# Patient Record
Sex: FEMALE | Race: WHITE
[De-identification: names, ages, dates, MRNs, and addresses within clinical notes are randomized per-mention and may not be internally consistent; named-entity substitution may affect disease eponyms.]

---

## 2018-02-05 ENCOUNTER — HOSPITAL ENCOUNTER (EMERGENCY)
Dept: HOSPITAL 25 - ED | Age: 68
Discharge: HOME | End: 2018-02-05
Payer: COMMERCIAL

## 2018-02-05 VITALS — SYSTOLIC BLOOD PRESSURE: 146 MMHG | DIASTOLIC BLOOD PRESSURE: 53 MMHG

## 2018-02-05 DIAGNOSIS — Z88.6: ICD-10-CM

## 2018-02-05 DIAGNOSIS — Z90.710: ICD-10-CM

## 2018-02-05 DIAGNOSIS — E78.00: ICD-10-CM

## 2018-02-05 DIAGNOSIS — I48.91: ICD-10-CM

## 2018-02-05 DIAGNOSIS — K57.90: ICD-10-CM

## 2018-02-05 DIAGNOSIS — I45.10: ICD-10-CM

## 2018-02-05 DIAGNOSIS — I10: ICD-10-CM

## 2018-02-05 DIAGNOSIS — E11.9: ICD-10-CM

## 2018-02-05 DIAGNOSIS — Z88.5: ICD-10-CM

## 2018-02-05 DIAGNOSIS — R07.89: Primary | ICD-10-CM

## 2018-02-05 DIAGNOSIS — R32: ICD-10-CM

## 2018-02-05 DIAGNOSIS — I20.9: ICD-10-CM

## 2018-02-05 DIAGNOSIS — Z88.8: ICD-10-CM

## 2018-02-05 DIAGNOSIS — E07.9: ICD-10-CM

## 2018-02-05 DIAGNOSIS — Z79.4: ICD-10-CM

## 2018-02-05 LAB
BASOPHILS # BLD AUTO: 0.1 10^3/UL (ref 0–0.2)
EOSINOPHIL # BLD AUTO: 0.1 10^3/UL (ref 0–0.6)
HCT VFR BLD AUTO: 39 % (ref 35–47)
HGB BLD-MCNC: 13.4 G/DL (ref 12–16)
INR PPP/BLD: 0.9 (ref 0.77–1.02)
LYMPHOCYTES # BLD AUTO: 2.2 10^3/UL (ref 1–4.8)
MCH RBC QN AUTO: 30 PG (ref 27–31)
MCHC RBC AUTO-ENTMCNC: 34 G/DL (ref 31–36)
MCV RBC AUTO: 88 FL (ref 80–97)
MONOCYTES # BLD AUTO: 0.4 10^3/UL (ref 0–0.8)
NEUTROPHILS # BLD AUTO: 3.7 10^3/UL (ref 1.5–7.7)
NRBC # BLD AUTO: 0 10^3/UL
NRBC BLD QL AUTO: 0.1
PLATELET # BLD AUTO: 216 10^3/UL (ref 150–450)
RBC # BLD AUTO: 4.45 10^6/UL (ref 4–5.4)
WBC # BLD AUTO: 6.5 10^3/UL (ref 3.5–10.8)

## 2018-02-05 PROCEDURE — 84443 ASSAY THYROID STIM HORMONE: CPT

## 2018-02-05 PROCEDURE — 93005 ELECTROCARDIOGRAM TRACING: CPT

## 2018-02-05 PROCEDURE — 82550 ASSAY OF CK (CPK): CPT

## 2018-02-05 PROCEDURE — 84484 ASSAY OF TROPONIN QUANT: CPT

## 2018-02-05 PROCEDURE — 71045 X-RAY EXAM CHEST 1 VIEW: CPT

## 2018-02-05 PROCEDURE — 80053 COMPREHEN METABOLIC PANEL: CPT

## 2018-02-05 PROCEDURE — 85610 PROTHROMBIN TIME: CPT

## 2018-02-05 PROCEDURE — 82553 CREATINE MB FRACTION: CPT

## 2018-02-05 PROCEDURE — 36415 COLL VENOUS BLD VENIPUNCTURE: CPT

## 2018-02-05 PROCEDURE — 99282 EMERGENCY DEPT VISIT SF MDM: CPT

## 2018-02-05 PROCEDURE — 83880 ASSAY OF NATRIURETIC PEPTIDE: CPT

## 2018-02-05 PROCEDURE — 81003 URINALYSIS AUTO W/O SCOPE: CPT

## 2018-02-05 PROCEDURE — 83735 ASSAY OF MAGNESIUM: CPT

## 2018-02-05 PROCEDURE — 85730 THROMBOPLASTIN TIME PARTIAL: CPT

## 2018-02-05 PROCEDURE — 85025 COMPLETE CBC W/AUTO DIFF WBC: CPT

## 2018-02-05 NOTE — RAD
HISTORY: Chest pain



COMPARISONS: September 24, 2009



VIEWS: 1: frontal portable view of the chest at 1:19 PM



FINDINGS:

LINES AND TUBES: None.

CARDIOMEDIASTINAL SILHOUETTE: The cardiomediastinal silhouette is normal for portable

technique.

PLEURA: The costophrenic angles are sharp. No pleural abnormalities are noted.

LUNG PARENCHYMA: The lungs are clear.

ABDOMEN: The upper abdomen is clear. There is no subphrenic gas.

BONES AND SOFT TISSUES: No bone or soft tissue abnormalities are noted.



IMPRESSION: NO ACTIVE CARDIOPULMONARY DISEASE.

## 2018-02-06 NOTE — ED
Phil SILVA Angela, scribed for Jordan Matta MD on 02/05/18 at 1312 .





HPI Chest Pain





- HPI Summary


HPI Summary: 





This pt is a 66 y/o [facility]/o chest pain for the past year

, worsening over the last month. Pt describes her chest pain as pressure, 

radiating from breasts outwards. She rates her pain 7/0 in severity. Today pt 

states she took her blood pressure at home with 2 different machine, and had a 

blood pressure averaging around 240/110. Pt states she has LE swelling (worse 

after surgery). Pt notes she called her cardiologist (Dr. Garza) and was 

advised to come to the ED. Pt used to see Dr. Lassiter at [facility], now is followed up 

by Dr. Garza at [place]. 


She states she has pain related to her surgery from Nov. 21st. Pt had spine 

surgery on November 21st in [place]. 


PMHx: GERD, diabetes, HTN. Pt takes Levemir and Metoprolol.





- History of Current Complaint


Chief Complaint: EDChestWallPain


Time Seen by Provider: 02/05/18 13:06


Hx Obtained From: Patient


Onset/Duration: Started Days Ago, Still Present


Timing: Lasting Days


Current Severity: Moderate


Pain Intensity: 7


Pain Scale Used: 0-10 Numeric


Chest Pain Location: Diffuse


Chest Pain Radiates: Yes


Chest Pain Radiates To:: Arm - bilateral arms


Character: Pressure/Squeezing - pressure


Aggravating Factor(s): Nothing


Alleviating Factor(s): Nothing


Associated Signs and Symptoms: Positive: Chest Pain, Other: - hypertensive





- Allergy/Home Medications


Allergies/Adverse Reactions: 


 Allergies











Allergy/AdvReac Type Severity Reaction Status Date / Time


 


MS Ibuprofen [Ibuprofen] Allergy  Heartburn Verified 02/05/18 16:07


 


MS Metformin [Metformin] Allergy  Heartburn Verified 02/05/18 16:07


 


various narcotics AdvReac  Nausea Uncoded 02/05/18 16:07











Home Medications: 


 Home Medications





Bumetanide TAB* [Bumex 1 MG TAB*] 0.5 mg PO DAILY 02/05/18 [History Confirmed 02 /05/18]


Cholecalciferol TAB* [Vitamin D TAB*] 2,000 unit PO DAILY 02/05/18 [History 

Confirmed 02/05/18]


Cyanocobalamin TAB* [Vitamin B12 TAB*] 500 mcg PO DAILY 02/05/18 [History 

Confirmed 02/05/18]


Insulin ASPART (NF) [Novolog (NF)] 2 - 11 units SUBCUT ACHS 02/05/18 [History 

Confirmed 02/05/18]


Insulin Detemir (NF) [Levemir (NF)] 20 unit SUBCUT DAILY 02/05/18 [History 

Confirmed 02/05/18]


Methocarbamol TAB* [Robaxin 500 MG TAB*] 750 mg PO Q6H PRN 02/05/18 [History 

Confirmed 02/05/18]


Multivitamins/Minerals TAB* [Theragran/minerals TAB*] 1 tab PO DAILY 02/05/18 [

History Confirmed 02/05/18]


oxyCODONE TAB* [Roxycodone TAB 5 mg*] 5 - 10 mg PO Q4H PRN 02/05/18 [History 

Confirmed 02/05/18]











PMH/Surg Hx/FS Hx/Imm Hx


Endocrine/Hematology History: Reports: Hx Diabetes - type 2, Hx Thyroid Disease


Cardiovascular History: Reports: Hx Angina, Hx Hypercholesterolemia, Hx 

Hypertension


   Denies: Hx Pacemaker/ICD


Respiratory History: 


   Denies: Hx Asthma - AS A CHILD


GI History: Reports: Hx Diverticulosis, Hx Hiatal Hernia, Other GI Disorders - 

DIFFICULTY SWALLOWING, DIARRHEA/CONSTIPATION


 History: Reports: Other  Problems/Disorders - INCONTINENCE


   Denies: Hx Dialysis, Hx Renal Disease


Musculoskeletal History: Reports: Hx Arthritis, Hx Back Problems, Other 

Musculoskeletal History - WEAKNESS/TREMORS


Sensory History: Reports: Hx Cataracts, Hx Contacts or Glasses


   Denies: Hx Hearing Aid


Opthamlomology History: Reports: Hx Cataracts, Hx Contacts or Glasses


Neurological History: Reports: Hx Headaches, Other Neuro Impairments/Disorders 

- POST CONCUSSION SYNDROME, DIZZINESS


Psychiatric History: 


   Denies: Hx Panic Disorder





- Surgical History


Surgery Procedure, Year, and Place: HYSTERECTOMY; ENDOMETRIOSIS SURGERY; RT AND 

LT BREAST BIOPSY; BREAST REDUCTION;.  NOVEMBER 2017 LUMBAR FUSION AND CYST 

REMOVAL;


Infectious Disease History: No


Infectious Disease History: 


   Denies: History Other Infectious Disease, Traveled Outside the US in Last 30 

Days





- Family History


Known Family History: Positive: Diabetes, Other - prostate and skin cancer





- Social History


Alcohol Use: Rare


Alcohol Amount: wine


Substance Use Type: Reports: None


Smoking Status (MU): Never Smoked Tobacco





Review of Systems


Negative: Fever, Chills


Cardiovascular: Other - hypertension


Positive: Chest Pain


Musculoskeletal: Other - chronic pain from surgery


All Other Systems Reviewed And Are Negative: Yes





Physical Exam





- Summary


Physical Exam Summary: 





VITAL SIGNS: Reviewed.


GENERAL: Patient is a well-developed and nourished female who is lying 

comfortable in the stretcher. Patient is not in any acute respiratory distress.


HEAD AND FACE: No signs of trauma. No ecchymosis, hematomas or skull 

depressions. No sinus tenderness.


EYES: PERRLA, EOMI x 2, No injected conjunctiva, no nystagmus.


EARS: Hearing grossly intact. Ear canals and tympanic membranes are within 

normal limits.


MOUTH: Oropharynx within normal limits. Dry oral mucosa. 


NECK: Supple, trachea is midline, no adenopathy, no JVD, no carotid bruit, no c-

spine tenderness, neck with full ROM.


CHEST: Symmetric, no tenderness at palpation


LUNGS: Clear to auscultation bilaterally. No wheezing or crackles.


CVS: Regular rate and rhythm, S1 and S2 present, no murmurs or gallops 

appreciated.


ABDOMEN: Soft, non-tender. No signs of distention. No rebound no guarding, and 

no masses palpated. Bowel sounds are normal.


EXTREMITIES: FROM in all major joints, no cyanosis or clubbing. 1+ pitting 

edema in bilateral lower extremity. 


NEURO: Alert and oriented x 3. No acute neurological deficits. Speech is normal 

and follows commands.


SKIN: Dry and warm


Triage Information Reviewed: Yes


Vital Signs On Initial Exam: 


 Initial Vitals











Temp Pulse Resp BP Pulse Ox


 


 97.6 F   60   18   149/70   98 


 


 02/05/18 12:21  02/05/18 12:21  02/05/18 12:21  02/05/18 12:21  02/05/18 12:21











Vital Signs Reviewed: Yes





Diagnostics





- Vital Signs


 Vital Signs











  Temp Pulse Resp BP Pulse Ox


 


 02/05/18 12:21  97.6 F  60  18  149/70  98














- Laboratory


Lab Results: 


 Lab Results











  02/05/18 02/05/18 02/05/18 Range/Units





  13:28 13:46 13:46 


 


WBC    6.5  (3.5-10.8)  10^3/ul


 


RBC    4.45  (4.0-5.4)  10^6/ul


 


Hgb    13.4  (12.0-16.0)  g/dl


 


Hct    39  (35-47)  %


 


MCV    88  (80-97)  fL


 


MCH    30  (27-31)  pg


 


MCHC    34  (31-36)  g/dl


 


RDW    14  (10.5-15)  %


 


Plt Count    216  (150-450)  10^3/ul


 


MPV    10  (7.4-10.4)  um3


 


Neut % (Auto)    56.8  (38-83)  %


 


Lymph % (Auto)    34.4  (25-47)  %


 


Mono % (Auto)    6.2  (1-9)  %


 


Eos % (Auto)    1.8  (0-6)  %


 


Baso % (Auto)    0.8  (0-2)  %


 


Absolute Neuts (auto)    3.7  (1.5-7.7)  10^3/ul


 


Absolute Lymphs (auto)    2.2  (1.0-4.8)  10^3/ul


 


Absolute Monos (auto)    0.4  (0-0.8)  10^3/ul


 


Absolute Eos (auto)    0.1  (0-0.6)  10^3/ul


 


Absolute Basos (auto)    0.1  (0-0.2)  10^3/ul


 


Absolute Nucleated RBC    0  10^3/ul


 


Nucleated RBC %    0.1  


 


INR (Anticoag Therapy)  0.90    (0.77-1.02)  


 


APTT  30.4    (26.0-36.3)  seconds


 


Sodium     (133-145)  mmol/L


 


Potassium     (3.5-5.0)  mmol/L


 


Chloride     (101-111)  mmol/L


 


Carbon Dioxide     (22-32)  mmol/L


 


Anion Gap     (2-11)  mmol/L


 


BUN     (6-24)  mg/dL


 


Creatinine     (0.51-0.95)  mg/dL


 


Est GFR ( Amer)     (>60)  


 


Est GFR (Non-Af Amer)     (>60)  


 


BUN/Creatinine Ratio     (8-20)  


 


Glucose     ()  mg/dL


 


Calcium     (8.6-10.3)  mg/dL


 


Magnesium     (1.9-2.7)  mg/dL


 


Total Bilirubin     (0.2-1.0)  mg/dL


 


AST     (13-39)  U/L


 


ALT     (7-52)  U/L


 


Alkaline Phosphatase     ()  U/L


 


Total Creatine Kinase     ()  U/L


 


CK-MB (CK-2)     (0.6-6.3)  ng/mL


 


Troponin I     (<0.04)  ng/mL


 


B-Natriuretic Peptide   65  ( - 100) pg/mL


 


Total Protein     (6.4-8.9)  g/dL


 


Albumin     (3.2-5.2)  g/dL


 


Globulin     (2-4)  g/dL


 


Albumin/Globulin Ratio     (1-3)  


 


TSH     (0.34-5.60)  mcIU/mL


 


Urine Color     


 


Urine Appearance     


 


Urine pH     (5-9)  


 


Ur Specific Gravity     (1.010-1.030)  


 


Urine Protein     (Negative)  


 


Urine Ketones     (Negative)  


 


Urine Blood     (Negative)  


 


Urine Nitrate     (Negative)  


 


Urine Bilirubin     (Negative)  


 


Urine Urobilinogen     (Negative)  


 


Ur Leukocyte Esterase     (Negative)  


 


Urine Glucose     (Negative)  














  02/05/18 02/05/18 02/05/18 Range/Units





  13:46 15:30 15:45 


 


WBC     (3.5-10.8)  10^3/ul


 


RBC     (4.0-5.4)  10^6/ul


 


Hgb     (12.0-16.0)  g/dl


 


Hct     (35-47)  %


 


MCV     (80-97)  fL


 


MCH     (27-31)  pg


 


MCHC     (31-36)  g/dl


 


RDW     (10.5-15)  %


 


Plt Count     (150-450)  10^3/ul


 


MPV     (7.4-10.4)  um3


 


Neut % (Auto)     (38-83)  %


 


Lymph % (Auto)     (25-47)  %


 


Mono % (Auto)     (1-9)  %


 


Eos % (Auto)     (0-6)  %


 


Baso % (Auto)     (0-2)  %


 


Absolute Neuts (auto)     (1.5-7.7)  10^3/ul


 


Absolute Lymphs (auto)     (1.0-4.8)  10^3/ul


 


Absolute Monos (auto)     (0-0.8)  10^3/ul


 


Absolute Eos (auto)     (0-0.6)  10^3/ul


 


Absolute Basos (auto)     (0-0.2)  10^3/ul


 


Absolute Nucleated RBC     10^3/ul


 


Nucleated RBC %     


 


INR (Anticoag Therapy)     (0.77-1.02)  


 


APTT     (26.0-36.3)  seconds


 


Sodium  138    (133-145)  mmol/L


 


Potassium  3.8    (3.5-5.0)  mmol/L


 


Chloride  105    (101-111)  mmol/L


 


Carbon Dioxide  25    (22-32)  mmol/L


 


Anion Gap  8    (2-11)  mmol/L


 


BUN  17    (6-24)  mg/dL


 


Creatinine  0.80    (0.51-0.95)  mg/dL


 


Est GFR ( Amer)  92.0    (>60)  


 


Est GFR (Non-Af Amer)  71.5    (>60)  


 


BUN/Creatinine Ratio  21.3 H    (8-20)  


 


Glucose  154 H    ()  mg/dL


 


Calcium  9.2    (8.6-10.3)  mg/dL


 


Magnesium  2.3    (1.9-2.7)  mg/dL


 


Total Bilirubin  0.60    (0.2-1.0)  mg/dL


 


AST  14    (13-39)  U/L


 


ALT  12    (7-52)  U/L


 


Alkaline Phosphatase  54    ()  U/L


 


Total Creatine Kinase  80    ()  U/L


 


CK-MB (CK-2)  2.6    (0.6-6.3)  ng/mL


 


Troponin I  0.00   0.00  (<0.04)  ng/mL


 


B-Natriuretic Peptide    ( - 100) pg/mL


 


Total Protein  6.4    (6.4-8.9)  g/dL


 


Albumin  4.0    (3.2-5.2)  g/dL


 


Globulin  2.4    (2-4)  g/dL


 


Albumin/Globulin Ratio  1.7    (1-3)  


 


TSH  2.32    (0.34-5.60)  mcIU/mL


 


Urine Color   Yellow   


 


Urine Appearance   Clear   


 


Urine pH   6.0   (5-9)  


 


Ur Specific Gravity   1.014   (1.010-1.030)  


 


Urine Protein   Negative   (Negative)  


 


Urine Ketones   Negative   (Negative)  


 


Urine Blood   Negative   (Negative)  


 


Urine Nitrate   Negative   (Negative)  


 


Urine Bilirubin   Negative   (Negative)  


 


Urine Urobilinogen   Negative   (Negative)  


 


Ur Leukocyte Esterase   Negative   (Negative)  


 


Urine Glucose   Negative   (Negative)  











Result Diagrams: 


 02/05/18 13:46





 02/05/18 13:46


Lab Statement: Any lab studies that have been ordered have been reviewed, and 

results considered in the medical decision making process.





- Radiology


  ** Chest XR


Xray Interpretation: No Acute Changes - IMPRESSION: No active cardiopulmonary 

disease. Dr. Matta has reviewed this radiology report.


Radiology Interpretation Completed By: Radiologist





- EKG


  ** 12:44


Cardiac Rate: NL


EKG Rhythm: Sinus Rhythm - at 70 bpm


EKG Interpretation: Right bundle branch block. 





Chest Pain Course/Dx





- Course


Assessment/Plan: This pt is a 66 y/o female [facility]/o chest pain 

for the past year, worsening over the last month. Pt describes her chest pain 

as pressure, radiating from breasts outwards. She rates her pain 7/0 in 

severity. Today pt states she took her blood pressure at home with 2 different 

machine, and had a blood pressure averaging around 240/110. Pt states she has 

LE swelling (worse after surgery). Pt notes she called her cardiologist (Dr. Garza) and was advised to come to the ED. Pt used to see Dr. Lassiter at [facility], 

now is followed up by Dr. Garza at [place].  She states she has pain 

related to her surgery from Nov. 21st. Pt had spine surgery on November 21st in 

[place].  PMHx: GERD, diabetes, HTN. Pt takes Levemir and metoprolol.  Test results 

without significant abnormalities except for glucose of 154. Troponin 1 and 2 

are both negative. Chest XR and EKG without any abnormalities. Since the pt 

continues to be asymptomatic, therefore the pt will be discharged to home with 

follow up from PCP and cardiology for further work up and management. Pt is 

instructed to return to the ED if her symptoms return or worsen.  Pt is 

hemodynamically stable, alert and oriented x3.





- Chest Pain


Differential Diagnosis/HQI/PQRI: Acute MI, ACS, Angina, CHF, Chest Wall, GI 

Disease, Lower Respiratory Infection





- Diagnoses


Provider Diagnoses: 


 Atypical chest pain








Discharge





- Discharge Plan


Condition: Stable


Disposition: HOME


Patient Education Materials:  Chest Pain (ED)


Referrals: 


Angelo Jolly MD [Primary Care Provider] - 3 Days


Additional Instructions: 


Please follow up with your primary care provider.





RETURN TO THE ED FOR ANY WORSENING SYMPTOMS.





The documentation as recorded by the Phil jovel Angela accurately reflects 

the service I personally performed and the decisions made by me, Jordan Matta MD.

## 2019-07-03 ENCOUNTER — HOSPITAL ENCOUNTER (INPATIENT)
Dept: HOSPITAL 25 - ED | Age: 69
LOS: 7 days | Discharge: HOME HEALTH | DRG: 720 | End: 2019-07-10
Attending: INTERNAL MEDICINE | Admitting: INTERNAL MEDICINE
Payer: COMMERCIAL

## 2019-07-03 DIAGNOSIS — J81.1: ICD-10-CM

## 2019-07-03 DIAGNOSIS — Z88.8: ICD-10-CM

## 2019-07-03 DIAGNOSIS — E11.622: ICD-10-CM
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
## 2019-07-03 DIAGNOSIS — L97.819: ICD-10-CM

## 2019-07-03 DIAGNOSIS — Z88.6: ICD-10-CM

## 2019-07-03 DIAGNOSIS — I89.0: ICD-10-CM

## 2019-07-03 DIAGNOSIS — S81.812A: ICD-10-CM

## 2019-07-03 DIAGNOSIS — I10: ICD-10-CM

## 2019-07-03 DIAGNOSIS — A41.9: Primary | ICD-10-CM

## 2019-07-03 DIAGNOSIS — Z79.4: ICD-10-CM
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
## 2019-07-03 DIAGNOSIS — Z80.8: ICD-10-CM

## 2019-07-03 DIAGNOSIS — K22.70: ICD-10-CM

## 2019-07-03 DIAGNOSIS — Z82.49: ICD-10-CM

## 2019-07-03 DIAGNOSIS — K58.9: ICD-10-CM

## 2019-07-03 DIAGNOSIS — I45.10: ICD-10-CM

## 2019-07-03 DIAGNOSIS — Z88.1: ICD-10-CM

## 2019-07-03 DIAGNOSIS — R10.31: ICD-10-CM

## 2019-07-03 DIAGNOSIS — L03.116: ICD-10-CM

## 2019-07-03 DIAGNOSIS — Z79.899: ICD-10-CM

## 2019-07-03 DIAGNOSIS — L97.829: ICD-10-CM

## 2019-07-03 DIAGNOSIS — I82.412: ICD-10-CM

## 2019-07-03 DIAGNOSIS — E11.649: ICD-10-CM

## 2019-07-03 DIAGNOSIS — K21.9: ICD-10-CM

## 2019-07-03 DIAGNOSIS — E66.9: ICD-10-CM

## 2019-07-03 DIAGNOSIS — E87.70: ICD-10-CM

## 2019-07-03 DIAGNOSIS — L03.115: ICD-10-CM

## 2019-07-03 LAB
ALBUMIN SERPL BCG-MCNC: 3.6 G/DL (ref 3.2–5.2)
ALBUMIN/GLOB SERPL: 1.4 {RATIO} (ref 1–3)
ALP SERPL-CCNC: 63 U/L (ref 34–104)
ALT SERPL W P-5'-P-CCNC: 29 U/L (ref 7–52)
ANION GAP SERPL CALC-SCNC: 10 MMOL/L (ref 2–11)
APTT PPP: 33.7 SECONDS (ref 26–38)
AST SERPL-CCNC: 30 U/L (ref 13–39)
BASOPHILS # BLD AUTO: 0.1 10^3/UL (ref 0–0.2)
BUN SERPL-MCNC: 26 MG/DL (ref 6–24)
BUN/CREAT SERPL: 19 (ref 8–20)
CALCIUM SERPL-MCNC: 9 MG/DL (ref 8.6–10.3)
CHLORIDE SERPL-SCNC: 102 MMOL/L (ref 101–111)
EOSINOPHIL # BLD AUTO: 0 10^3/UL (ref 0–0.6)
GLOBULIN SER CALC-MCNC: 2.6 G/DL (ref 2–4)
GLUCOSE SERPL-MCNC: 129 MG/DL (ref 70–100)
HCO3 SERPL-SCNC: 24 MMOL/L (ref 22–32)
HCT VFR BLD AUTO: 34 % (ref 35–47)
HGB BLD-MCNC: 11.7 G/DL (ref 12–16)
INR PPP/BLD: 1.29 (ref 0.82–1.09)
LYMPHOCYTES # BLD AUTO: 1.2 10^3/UL (ref 1–4.8)
MCH RBC QN AUTO: 30 PG (ref 27–31)
MCHC RBC AUTO-ENTMCNC: 34 G/DL (ref 31–36)
MCV RBC AUTO: 87 FL (ref 80–97)
MONOCYTES # BLD AUTO: 0.6 10^3/UL (ref 0–0.8)
NEUTROPHILS # BLD AUTO: 16.4 10^3/UL (ref 1.5–7.7)
NRBC # BLD AUTO: 0 10^3/UL
NRBC BLD QL AUTO: 0
PLATELET # BLD AUTO: 182 10^3/UL (ref 150–450)
POTASSIUM SERPL-SCNC: 3.8 MMOL/L (ref 3.5–5)
PROT SERPL-MCNC: 6.2 G/DL (ref 6.4–8.9)
RBC # BLD AUTO: 3.91 10^6 /UL (ref 3.7–4.87)
SODIUM SERPL-SCNC: 136 MMOL/L (ref 135–145)
TROPONIN I SERPL-MCNC: 0.03 NG/ML (ref ?–0.04)
WBC # BLD AUTO: 18.2 10^3/UL (ref 3.5–10.8)

## 2019-07-03 PROCEDURE — 81003 URINALYSIS AUTO W/O SCOPE: CPT

## 2019-07-03 PROCEDURE — 71275 CT ANGIOGRAPHY CHEST: CPT

## 2019-07-03 PROCEDURE — 36415 COLL VENOUS BLD VENIPUNCTURE: CPT

## 2019-07-03 PROCEDURE — 85025 COMPLETE CBC W/AUTO DIFF WBC: CPT

## 2019-07-03 PROCEDURE — 80048 BASIC METABOLIC PNL TOTAL CA: CPT

## 2019-07-03 PROCEDURE — 94640 AIRWAY INHALATION TREATMENT: CPT

## 2019-07-03 PROCEDURE — 84484 ASSAY OF TROPONIN QUANT: CPT

## 2019-07-03 PROCEDURE — 83735 ASSAY OF MAGNESIUM: CPT

## 2019-07-03 PROCEDURE — 80202 ASSAY OF VANCOMYCIN: CPT

## 2019-07-03 PROCEDURE — 85730 THROMBOPLASTIN TIME PARTIAL: CPT

## 2019-07-03 PROCEDURE — 87040 BLOOD CULTURE FOR BACTERIA: CPT

## 2019-07-03 PROCEDURE — 80053 COMPREHEN METABOLIC PANEL: CPT

## 2019-07-03 PROCEDURE — 71045 X-RAY EXAM CHEST 1 VIEW: CPT

## 2019-07-03 PROCEDURE — 83605 ASSAY OF LACTIC ACID: CPT

## 2019-07-03 PROCEDURE — 93005 ELECTROCARDIOGRAM TRACING: CPT

## 2019-07-03 PROCEDURE — 85610 PROTHROMBIN TIME: CPT

## 2019-07-03 PROCEDURE — 99285 EMERGENCY DEPT VISIT HI MDM: CPT

## 2019-07-03 PROCEDURE — 82565 ASSAY OF CREATININE: CPT

## 2019-07-03 PROCEDURE — 84520 ASSAY OF UREA NITROGEN: CPT

## 2019-07-03 RX ADMIN — APIXABAN SCH MG: 5 TABLET, FILM COATED ORAL at 23:19

## 2019-07-03 RX ADMIN — CEFEPIME HYDROCHLORIDE SCH MLS/HR: 1 INJECTION, SOLUTION INTRAVENOUS at 22:26

## 2019-07-03 RX ADMIN — ACETAMINOPHEN PRN MG: 325 TABLET ORAL at 21:17

## 2019-07-03 RX ADMIN — ALUMINUM HYDROXIDE, MAGNESIUM HYDROXIDE, AND SIMETHICONE PRN ML: 200; 200; 20 SUSPENSION ORAL at 23:50

## 2019-07-03 RX ADMIN — SODIUM CHLORIDE SCH MLS/HR: 900 IRRIGANT IRRIGATION at 19:57

## 2019-07-03 NOTE — ED
Complex/Multi-Sys Presentation





- HPI Summary


HPI Summary: 


68 year [facility] F [facility] accompanied by 

 with a chief complaint of fever since yesterday. The patient rates the 

pain 3/10 in severity. Symptoms aggravated by nothing. Symptoms alleviated by 

Tylenol, last taken one hour prior to arrival.  states that patient had 

a temp 103.8 last night, temp 104.7 before seeing her primary care provider 

today, temp 103.9 at her primary care provider's office today. Patient denies 

nausea, vomiting, decreased appetite, difficulty having bowel movements. 

Patient additionally has swelling of the left lower extremity and erythema of 

the left lower extremity. She reports increasing left lower extremity pain in 

the last 2 days. She reports left calf pain. She reports increasing bilateral 

leg weakness - she is unable to stand and unable to move - if she becomes 

unsteady, she cannot adjust quickly, which has resulted in multiple falls. She 

denies fall this week. Patient was given Keflex last week for LLE cellulitis - 

but did not take it Also in the last several weeks, as her ability to walk has 

worsened, and as she has felt increasingly fatigued, patient has noticed 

worsening burning in her epigastrium. Patient states she is taking diuretics, 

which are "destroying her kidneys." Patient denies being on dialysis and denies 

plans to be on dialysis. Patients medication reviewed this visit.





- History Of Current Complaint


Chief Complaint: EDFever


Time Seen by Provider: 07/03/19 16:17


Hx Obtained From: Patient, Family/Caretaker - 


Onset/Duration: Lasting Days - 1, Still Present


Timing: Constant


Severity Currently: Mild


Aggravating Factor(s): Nothing


Alleviating Factor(s): alleviated by Tylenol, last taken one hour prior to 

arrival


Associated Signs And Symptoms: Positive: Other - swelling of the left lower 

extremity and erythema of the left lower extremity, left lower extremity pain, 

left calf pain, bilateral leg weakness, burning in her epigastrium; NEGATIVE: 

nausea, vomiting, decreased appetite, difficulty having bowel movements





- Allergies/Home Medications


Allergies/Adverse Reactions: 


 Allergies











Allergy/AdvReac Type Severity Reaction Status Date / Time


 


ibuprofen Allergy  Unknown Verified 07/03/19 16:15





   Reaction  





   Details  


 


metformin Allergy  Unknown Verified 07/03/19 16:15





   Reaction  





   Details  


 


various narcotics AdvReac  Nausea Uncoded 07/03/19 16:15











Home Medications: 


 Home Medications





Dexlansoprazole [Dexilant] 30 mg PO DAILY 07/03/19 [History Confirmed 07/03/19]


Spironolactone TAB* [Aldactone TAB*] 25 mg PO DAILY 07/03/19 [History Confirmed 

07/03/19]











PMH/Surg Hx/FS Hx/Imm Hx


Previously Healthy: No


Endocrine/Hematology History: Reports: Hx Diabetes - type 2, Hx Thyroid Disease


Cardiovascular History: Reports: Hx Angina, Hx Hypercholesterolemia, Hx 

Hypertension


   Denies: Hx Pacemaker/ICD


Respiratory History: 


   Denies: Hx Asthma - AS A CHILD


GI History: Reports: Hx Diverticulosis, Hx Gastroesophageal Reflux Disease, Hx 

Hiatal Hernia, Hx Irritable Bowel, Other GI Disorders - DIFFICULTY SWALLOWING, 

DIARRHEA/CONSTIPATION


 History: Reports: Other  Problems/Disorders - INCONTINENCE


   Denies: Hx Dialysis, Hx Renal Disease


Musculoskeletal History: Reports: Hx Arthritis, Hx Back Problems, Other 

Musculoskeletal History - WEAKNESS/TREMORS


Sensory History: Reports: Hx Cataracts, Hx Contacts or Glasses


   Denies: Hx Hearing Aid


Opthamlomology History: Reports: Hx Cataracts, Hx Contacts or Glasses


Neurological History: Reports: Hx Headaches, Other Neuro Impairments/Disorders 

- POST CONCUSSION SYNDROME, DIZZINESS


Psychiatric History: 


   Denies: Hx Panic Disorder





- Surgical History


Surgery Procedure, Year, and Place: HYSTERECTOMY; ENDOMETRIOSIS SURGERY; RT AND 

LT BREAST BIOPSY; BREAST REDUCTION;.  NOVEMBER 2017 LUMBAR FUSION AND CYST 

REMOVAL;.  back surgery 2018


Infectious Disease History: No


Infectious Disease History: 


   Denies: History Other Infectious Disease, Traveled Outside the [place] in Last 30 

Days





- Family History


Known Family History: Positive: Diabetes, Other - prostate and skin cancer, Non-

Contributory





- Social History


Occupation: Retired


Lives: With Family


Alcohol Use: Rare


Alcohol Amount: wine


Hx Substance Use: No


Substance Use Type: Reports: None


Hx Tobacco Use: No


Smoking Status (MU): Never Smoked Tobacco





Review of Systems


Positive: Fever


Gastrointestinal: Negative - decreased appetite, difficulty having bowel 

movements


Positive: Other - burning in her epigastrium.  Negative: Vomiting, Nausea


Positive: Other - swelling of the left lower extremity and erythema of the left 

lower extremity, left lower extremity pain, left calf pain, bilateral leg 

weakness


All Other Systems Reviewed And Are Negative: Yes





Physical Exam





- Summary


Physical Exam Summary: 








Vital Signs Reviewed: Yes


A+Ox3, no distress


Eyes: Conjunctiva Clear, VERONICA. EOM intact and full


ENT: Hearing grossly normal  TM x 2 clear, turbinates minimal inflammation 

mmoist, uvula midline, no exudate, + erythema 


Neck: Positive: Supple, + submandicular LA R>L


Respiratory: Positive: No respiratory distress, No accessory muscle use + CTA 

throughout  no w/r


Cardiovascular: RRR  nl s1, s2  no m/r  CBT <2  sec  + 2+ edema b/l LE  mild 

TTP left calf


abd soft + BS nt/nd  no guarding, no distension


Musculoskeletal Exam: HAYWOOD x 4 without difficulty Strength Intact, ROM Intact


Neurological: Positive: Alert,  + sensation throughout


Psychological: Positive: Normal Response To evaluator


Skin: Positive: no rash, no ecchymosis, LLE edema and cellulitis to upper 1/2 

LE  circumferential, warm  


Triage Information Reviewed: Yes


Vital Signs On Initial Exam: 


 Initial Vitals











Temp Pulse Resp BP Pulse Ox


 


 98.6 F   91   19   151/85   94 


 


 07/03/19 16:09  07/03/19 16:09  07/03/19 16:09  07/03/19 16:09  07/03/19 16:09











Vital Signs Reviewed: Yes





Diagnostics





- Vital Signs


 Vital Signs











  Temp Pulse Resp BP Pulse Ox


 


 07/03/19 16:09  98.6 F  91  19  151/85  94














- Laboratory


Result Diagrams: 


 07/06/19 05:08





 07/07/19 10:23


Lab Statement: Any lab studies that have been ordered have been reviewed, and 

results considered in the medical decision making process.





- Radiology


  ** CXR


Radiology Interpretation Completed By: Radiologist


Summary of Radiographic Findings: NO ACTIVE CARDIOPULMONARY DISEASE IS NOTED. 

ED physician has reviewed this report.





- EKG


  ** 1647


Cardiac Rate: NL - 76 BPM


EKG Rhythm: Sinus Rhythm


Summary of EKG Findings: Right bundle branch block is new compared to 2/5/18. 

No acute changes. Non-STEMI.





Re-Evaluation





- Re-Evaluation


  ** First Eval


Comment: d/w pharmacist - Will use Doxy - cephalosporin not US made in whole.  

will admit.  Pt in agreement.  US result pending - d/w Dr. Jose Chin Multi-Symp Course/Dx


Course Of Treatment: Pt presents with fever x 48 hours, progressive pain and 

cellulitis LLE. Pt had Rx keflex - did not take. Today with fever 103 -PCP sent 

to hospital Pt with chronic edema LE - pt with progressive discomfort LLE  APAP 

given at PCP.  VSS- slight elevated BP - h/o  Pt with cellultisi, 

circumferential LLE.  will check labs, US.  d/w pt will likely require 

hospitalization for IV abx.  pt requesitng US meds only -will d/w pharmacist





- Diagnoses


Provider Diagnoses: 


 Cellulitis, Fever








- Physician Notifications


Discussed Care Of Patient With: Ava Garcia


Time Discussed With Above Provider: 18:42


Instructed by Provider To: Admit As Inpatient - Dr. Garcia, hospitalist, agrees 

to admit the patient





Discharge





- Sign-Out/Discharge


Documenting (check all that apply): Patient Departure


All imaging exams completed and their final reports reviewed: No





- Discharge Plan


Condition: Stable


Disposition: ADMITTED TO [facility]





- Billing Disposition and Condition


Condition: STABLE


Disposition: Admitted to [facility]





- Attestation Statements


Document Initiated by Scribe: Yes


Documenting Scribe: Sonja Johnson


Provider For Whom Scribe is Documenting (Include Credential): Massiel Fair MD


Scribe Attestation: 


Sonja SILVA, scribed for Massiel Fair MD on 07/07/19 at 1247. 


Scribe Documentation Reviewed: Yes


Provider Attestation: 


The documentation as recorded by the scribSonja oliveira accurately reflects 

the service I personally performed and the decisions made by me, Massiel Fair MD


Status of Scribe Document: Viewed

## 2019-07-03 NOTE — HP
CC:  Dr. Jolly *

 

HISTORY AND PHYSICAL:

 

DATE OF ADMISSION:  07/03/19

 

PROVIDER:  Mo Wahl NP

 

PRIMARY CARE PROVIDER:  Dr. Jolly.

 

ATTENDING PHYSICIAN WHILE IN THE HOSPITAL:  Dr. Yao Ramos * (dictated by 
Mo Wahl NP).

 

CHIEF COMPLAINT:

1.  Fever.

2.  Lower extremity redness.

 

HISTORY OF PRESENT ILLNESS:  Ms. Geronimo is a 68-year-old female with past 
medical history significant for hypertension, GERD, Drummond's esophagus, edema, 
and diabetes, who presented to the emergency room with complaints of fever and 
lower extremity redness and swelling.  The patient reports that she developed a 
fever yesterday evening, has had fever and chills all night and throughout the 
day today. She reports that she has had redness to bilateral lower extremities 
for approximately 1 week, progressively getting worse.  She also reports some 
open scabbed areas noted to bilateral lower extremities.  She also reports 
increased pain to her bilateral lower legs as well as she reports chronic 
nausea.  She does report chest pain and pain with deep breath.  She denies any 
cough, hemoptysis. She does report shortness of breath.  Also she does report 
nausea associated with her edema.  No diarrhea or abdominal pain.  No gross 
hematuria or dysuria.  She does complain of burning with urination at times.  
Denies any focal weakness or sensory loss, dysphagia, arthralgias, myalgias.  
She does complain of increased erythema and open ulcerations to bilateral lower 
extremities.  Denies any psychosis or anxiety.

 

While in the emergency room, she had routine lab work drawn.  She was found to 
have a white count of 18.2.  She had a fever of 102.0.  Due to these findings, 
we were asked to see and evaluate the patient for admission.  She also had 
venous Doppler of her left lower leg which did show positive for DVT.

 

PAST MEDICAL HISTORY:  Significant for:

1.  Spastic colon.

2.  Hypertension.

3.  GERD.

4.  Drummond's esophagus.

5.  Edema.

6.  Type 2 diabetes.

 

PAST SURGICAL HISTORY:

1.  Hysterectomy.

2.  Breast reduction.

3.  Spinal fusion.

 

HOME MEDICATIONS:  Include:

1.  Aldactone 25 mg p.o. daily.

2.  Toprol 12.5 mg p.o. daily.

3.  Dexilant 30 mg p.o. daily.

4.  Diovan 40 mg p.o. daily.

5.  Torsemide 10 mg p.o. daily.

6.  NovoLog sliding scale.

7.  Levemir 16 units in the a.m. and 20 units in the p.m.

 

ALLERGIES:

1.  IBUPROFEN.

2.  METFORMIN.

3.  VARIOUS ANTIBIOTICS.

4.  NSAIDS.

 

FAMILY HISTORY:  Father with quadruple bypass at the age of 85.  No reported 
history of diabetes, cancer.  Mother with basal cell carcinoma of the skin. 
Brother with fibrosarcoma.

 

SOCIAL HISTORY:  The patient denies any tobacco, alcohol, or illicit drug use.  
She is .  She lives with her .  She uses a cane or a walker for 
ambulation at home.  Surrogate decision maker in the event she is unable to 
make her own decisions is her .  She is a full code.

 

REVIEW OF SYSTEMS:  A 14-point review of systems was completed.  All pertinent 
positives are mentioned in the HPI.

 

                               PHYSICAL EXAMINATION

 

GENERAL:  At this time, Ms. Geronimo is a 68-year-old female.  She is alert and 
oriented, resting on the stretcher in the emergency room.  She is in no acute 
distress.

 

VITAL SIGNS:  Temperature was 100, heart rate 82, respirations 18, O2 
saturation 96%, blood pressure was 136/53.

 

HEENT:  Head is atraumatic, normocephalic.  Eyes:  EOMs are intact.  Sclerae 
anicteric and not pale.  Oral mucosa appeared to be moist.

 

NECK:  Supple.

 

LUNGS:  Clear to auscultation bilaterally.  No wheezes, rales, or rhonchi.

 

CARDIAC:  S1, S2.  Regular rate and rhythm.  No murmurs, rubs, or gallops.

 

ABDOMEN:  Obese, soft, and nontender.  Bowel sounds are present x4.

 

MUSCULOSKELETAL:  She is able to move all 4 extremities.  There is no clubbing 
or cyanosis.

 

NEUROLOGIC:  She is awake, alert, oriented x3.  Speech is clear.  Thought 
process is intact.  She has no gross focal deficits.

 

SKIN:  She does have erythema noted to bilateral lower extremities, left 
greater than right.  She does have open ulcerations that are nondraining at 
this time. Skin is warm to touch.  Erythemas on the left extends from her knee 
down.

 

 DIAGNOSTIC STUDIES/LAB DATA:  WBCs are 18.2, RBCs 3.91, hemoglobin 11.7, 
hematocrit 34, platelet count 182.  INR is 1.29.  APTT was 33.7.  Sodium 136, 
potassium 3.8, chloride 102, carbon dioxide is 24, anion gap is 10, BUN 26, 
creatinine 1.37, GFR 38.3, glucose 129, lactic acid 1.6, calcium 9.  ASTs were 
30, ALTs 29, alkaline phosphatase 63.  Total protein was 6.2.  Urine was within 
normal limits with exception to specific gravity of 1.006.

 

She had an electrocardiogram, which showed sinus rhythm with a right bundle 
branch block at a rate of 76, consistent with prior EKG.  She had a chest x-ray 
that showed no acute cardiopulmonary disease.  She had a Doppler of the left 
lower extremity.  Study was positive for nonocclusive thrombus in the 
visualized portions of the left profunda femoris vein, age indeterminate. 

 

ASSESSMENT AND PLAN:  Ms. Geronimo is a 68-year-old female with past medical 
history significant for hypertension, gastroesophageal reflux disease, Drummond'
s esophagus, lower extremity edema, and type 2 diabetes, who presented to the 
emergency room with fever and bilateral lower leg cellulitis.  She was found to 
have a DVT in her left leg.  She will be admitted to inpatient for:

 

1.  Cellulitis:  The patient does meet sepsis criteria with elevated white 
count of 18.2, tachypnea with a respiratory rate of 22, and suspected source of 
cellulitis to bilateral lower extremities.  The patient was given doxycycline 
in the emergency room.  I will start her on vancomycin and cefepime and will 
continue to monitor. 

2.  Left leg DVT.  The patient does have new DVT to the left leg profunda 
femoris vein .  I will start her on Eliquis 10 mg p.o. b.i.d. x7 days and then 
she will need to continue on Eliquis 5 mg p.o. b.i.d. until further 
recommendations by her primary care physician.

3.  Diabetes.  I will place the patient on fingersticks a.c. and h.s.  She 
would like to continue with her personal NovoLog insulin and Levemir.  The 
patient will bring in her medications from home.

4.  Hypertension.  She will continue her Toprol as previously prescribed.  I 
will hold her Aldactone.

5.  Edema.  The patient does have chronic lower extremity edema.  She is on 
torsemide and Aldactone.  I will hold these as the patient is meeting sepsis at 
this time.

6.  Gastroesophageal reflux disease.  She will continue on Dexilant.  The 
patient would like to continue her home medication.  This will be sent to the 
pharmacy for the patient.

7.  FEN.  She can have a consistent carb diet.

8.  Code status.  She is a full code.

9.  DVT prophylaxis.  She will be on Eliquis.  Mechanical DVT prophylaxis at 
this time is contraindicated as the patient does have DVT in her lower 
extremity and lower extremity swelling.

 

TIME SPENT:  Time spent on this admission was approximately 60 minutes, greater 
than half that time was spent at the bedside reviewing the events leading thus 
far to her hospitalization, performing physical exam, and reviewing my plan of 
care.

 

I have discussed this with my attending Dr. Yao Ramos; he is in agreement 
with my plan.

 

 ____________________________________ MO WAHL, ALY

 

[id number]/CPS #: [id number]

MTDD

## 2019-07-04 LAB
ANION GAP SERPL CALC-SCNC: 8 MMOL/L (ref 2–11)
BASOPHILS # BLD AUTO: 0 10^3/UL (ref 0–0.2)
BUN SERPL-MCNC: 20 MG/DL (ref 6–24)
BUN/CREAT SERPL: 17.2 (ref 8–20)
CALCIUM SERPL-MCNC: 8.3 MG/DL (ref 8.6–10.3)
CHLORIDE SERPL-SCNC: 106 MMOL/L (ref 101–111)
EOSINOPHIL # BLD AUTO: 0 10^3/UL (ref 0–0.6)
GLUCOSE SERPL-MCNC: 151 MG/DL (ref 70–100)
HCO3 SERPL-SCNC: 20 MMOL/L (ref 22–32)
HCT VFR BLD AUTO: 32 % (ref 35–47)
HGB BLD-MCNC: 10.6 G/DL (ref 12–16)
LYMPHOCYTES # BLD AUTO: 1.1 10^3/UL (ref 1–4.8)
MCH RBC QN AUTO: 30 PG (ref 27–31)
MCHC RBC AUTO-ENTMCNC: 33 G/DL (ref 31–36)
MCV RBC AUTO: 91 FL (ref 80–97)
MONOCYTES # BLD AUTO: 0.6 10^3/UL (ref 0–0.8)
NEUTROPHILS # BLD AUTO: 13.1 10^3/UL (ref 1.5–7.7)
NRBC # BLD AUTO: 0 10^3/UL
NRBC BLD QL AUTO: 0.2
PLATELET # BLD AUTO: 159 10^3/UL (ref 150–450)
POTASSIUM SERPL-SCNC: 4.1 MMOL/L (ref 3.5–5)
RBC # BLD AUTO: 3.56 10^6 /UL (ref 3.7–4.87)
SODIUM SERPL-SCNC: 134 MMOL/L (ref 135–145)
WBC # BLD AUTO: 14.9 10^3/UL (ref 3.5–10.8)

## 2019-07-04 RX ADMIN — SODIUM CHLORIDE SCH MLS/HR: 900 IRRIGANT IRRIGATION at 18:57

## 2019-07-04 RX ADMIN — AMLODIPINE BESYLATE ONE: 5 TABLET ORAL at 23:25

## 2019-07-04 RX ADMIN — APIXABAN SCH MG: 5 TABLET, FILM COATED ORAL at 20:01

## 2019-07-04 RX ADMIN — THERA TABS SCH: TAB at 09:01

## 2019-07-04 RX ADMIN — INSULIN ASPART SCH: 100 INJECTION, SOLUTION INTRAVENOUS; SUBCUTANEOUS at 20:35

## 2019-07-04 RX ADMIN — AMLODIPINE BESYLATE ONE MG: 5 TABLET ORAL at 20:42

## 2019-07-04 RX ADMIN — SODIUM CHLORIDE SCH MLS/HR: 900 IRRIGANT IRRIGATION at 07:56

## 2019-07-04 RX ADMIN — CEFEPIME HYDROCHLORIDE SCH MLS/HR: 1 INJECTION, SOLUTION INTRAVENOUS at 10:44

## 2019-07-04 RX ADMIN — APIXABAN SCH MG: 5 TABLET, FILM COATED ORAL at 09:00

## 2019-07-04 RX ADMIN — DEXLANSOPRAZOLE SCH MG: 30 CAPSULE, DELAYED RELEASE ORAL at 12:44

## 2019-07-04 RX ADMIN — ALUMINUM HYDROXIDE, MAGNESIUM HYDROXIDE, AND SIMETHICONE PRN ML: 200; 200; 20 SUSPENSION ORAL at 20:06

## 2019-07-04 RX ADMIN — ACETAMINOPHEN PRN MG: 325 TABLET ORAL at 08:59

## 2019-07-04 RX ADMIN — VANCOMYCIN HYDROCHLORIDE SCH MLS/HR: 1 INJECTION, POWDER, LYOPHILIZED, FOR SOLUTION INTRAVENOUS at 12:34

## 2019-07-04 RX ADMIN — INSULIN DETEMIR SCH UNIT: 100 INJECTION, SOLUTION SUBCUTANEOUS at 20:56

## 2019-07-04 RX ADMIN — INSULIN DETEMIR SCH UNIT: 100 INJECTION, SOLUTION SUBCUTANEOUS at 12:47

## 2019-07-04 RX ADMIN — VALSARTAN SCH MG: 40 TABLET ORAL at 20:45

## 2019-07-04 RX ADMIN — INSULIN ASPART SCH: 100 INJECTION, SOLUTION INTRAVENOUS; SUBCUTANEOUS at 17:12

## 2019-07-04 RX ADMIN — ACETAMINOPHEN PRN MG: 325 TABLET ORAL at 20:01

## 2019-07-04 RX ADMIN — CEFEPIME HYDROCHLORIDE SCH MLS/HR: 1 INJECTION, SOLUTION INTRAVENOUS at 23:26

## 2019-07-04 NOTE — PN
Subjective


Date of Service: 19


Interval History: 





Patient seen and examined. Patient appears anxious, asked many questions about 

her medications and potential outcomes. Explained POC in detail. Patient 

complains of leg pains and edema. Denies SOB, no chest pain, no fevers or 

chills today. No further complaints.





Objective


Active Medications: 








Acetaminophen (Tylenol Tab*)  650 mg PO Q4H PRN


   PRN Reason: FEVER/PAIN


   Last Admin: 19 08:59 Dose:  650 mg


Al Hydrox/Mg Hydrox/Simethicone (Maalox Plus*)  30 ml PO Q2H PRN


   PRN Reason: DYSPEPSIA


   Last Admin: 19 23:50 Dose:  30 ml


Apixaban (Eliquis*)  10 mg PO BID [facility]


   Stop: 07/10/19 09:01


   Last Admin: 19 09:00 Dose:  10 mg


Dexlansoprazole (Dexilant (Nf))  30 mg PO DAILY [facility]


   Last Admin: 19 12:44 Dose:  30 mg


Sodium Chloride (Ns 0.9% 1000 Ml**)  1,000 mls @ 125 mls/hr IV PER RATE [facility]


   Last Admin: 19 07:56 Dose:  125 mls/hr


Cefepime HCl (Maxipime 1 Gm In Dextrose Duplex (*))  1 gm in 50 mls @ 100 mls/

hr IV Q12H [facility]


   Last Admin: 19 10:44 Dose:  100 mls/hr


Vancomycin HCl 1,000 mg/ (Sodium Chloride)  250 mls @ 166.667 mls/hr IVPB Q12H 

[facility]


   Last Admin: 19 12:34 Dose:  166.667 mls/hr


Insulin Aspart (Novolog (Nf))  0 - 10 unit SUBCUT ACHS [facility]; Protocol


Insulin Detemir (Levemir (Nf))  20 unit SUBCUT BEDTIME [facility]


Insulin Detemir (Levemir (Nf))  16 unit SUBCUT 0900 [facility]


   Last Admin: 19 12:47 Dose:  16 unit


Metoprolol Succinate (Toprol Xl Tab*)  25 mg PO DAILY [facility]


Multivitamins/Minerals (Theragran/Minerals Tab*)  1 tab PO DAILY [facility]


   Last Admin: 19 09:01 Dose:  Not Given


Ondansetron HCl (Zofran Inj*)  4 mg IV Q6H PRN


   PRN Reason: NAUSEA


Pharmacy Consult (Vancomycin Per Pharmacy*)  1 note FOLLOW UP . PRN


   PRN Reason: PER PROTOCOL


Pharmacy Profile Note (Vancomycin Trough Check)  1 note FOLLOW UP 1030 [facility]


   Stop: 19 10:31


Valsartan (Diovan Tab*)  40 mg PO BEDTIME [facility]








 Vital Signs - 8 hr











  19





  11:30 15:00


 


Temperature 98.2 F 98.2 F


 


Pulse Rate 77 77


 


Respiratory 16 18





Rate  


 


Blood Pressure 132/57 130/41





(mmHg)  


 


O2 Sat by Pulse 95 98





Oximetry  











Oxygen Devices in Use Now: None


Appearance: alert, NAD


Eyes: No Scleral Icterus, PERRLA


Ears/Nose/Mouth/Throat: NL Teeth, Lips, Gums, Mucous Membranes Moist


Neck: NL Appearance and Movements; NL JVP, Trachea Midline


Respiratory: Symmetrical Chest Expansion and Respiratory Effort, Clear to 

Auscultation


Cardiovascular: RRR, No Edema


Abdominal: NL Sounds; No Tenderness; No Distention


Extremities: - - bilateral LE lymphedema with bilateral cellulitis and open 

areas/scabs and erythema


Neurological: Alert and Oriented x 3, NL Sensation


Nutrition: Taking PO's


Result Diagrams: 


 19 05:00





 19 05:00


Diagnostic Imaging: 





Patient Name:         CHRISTIANO SILVERIO                                            

                        Medical Record#: [id number]


Ordering Physician: Carli Wahl NP                                        

                        Acct.#: [id number]


:     1950         Age: 68   Sex: F                                   

                        Location: [address] [place] [address]/TELEMETRY


Exam Date: 19                                                       

                        ADM Status: ADM IN


Order Information:                         CTA CHEST


Accession Number:                          [id number]


CPT:                                       [id number]


EXAM: 


 CT Angiography Chest With Contrast 





EXAM DATE/TIME: 


 7/3/2019 10:10 PM 





CLINICAL HISTORY: 


 68 years old, female; Shortness of breath; Additional info: Chest pain, 


shortness of breath, left leg dvt 





TECHNIQUE: 


 Imaging protocol: Axial computed tomographic angiography images of the chest 


with intravenous contrast using CT angiography protocol. Coronal and sagittal 


reformatted images were created and reviewed. 


 3D rendering: MIP reconstructed images were created and reviewed. 


 Radiation optimization: All CT scans at this facility use at least one of 


these dose optimization techniques: automated exposure control; mA and/or kV 


adjustment per patient size (includes targeted exams where dose is matched to 


clinical indication); or iterative reconstruction. 


 Contrast material: VISI 320; Contrast volume: 85 ml; Contrast route: IV; 





COMPARISON: 


 OT CXR PORTAP CHEST AP OR PORT 7/3/2019 5:01 PM 





FINDINGS: 


 Pulmonary arteries: Images were acquired with contrast located in both the 


systemic and pulmonary arterial phase. No evidence of acute pulmonary embolic 


disease in the main or segmental pulmonary arteries. The distal subsegmental 


pulmonary arteries are difficult to assess due to the lack of intravenous 


contrast. 


 Aorta: Atheromatous changes involving the thoracic aorta. No aortic aneurysm 


or dissection. 


 Lungs: No pulmonary consolidation. 


 Pleural space: No pleural effusion. No pneumothorax. 


 Heart: The heart is of normal size. No pericardial thickening or effusion. 


Mild coronary calcification. 


 Mediastinum: Large hiatal hernia. No mediastinal mass. 


 Stomach and bowel: A portion of the stomach is located within the thoracic 


cage. 


 Lymph nodes: No mediastinal adenopathy. 


 Bones/joints: The thoracic cage is intact. Multilevel degenerative thoracic 


disc disease. No fracture of the thoracic vertebral bodies. No pathologic 


subluxation. 


 Soft tissues: Unremarkable. 





IMPRESSION: 


Less than optimal opacification of the pulmonary arterial system. No evidence 


of clot in the main pulmonary arteries or segmental pulmonary arteries. The 


distal subsegmental pulmonary arteries are not adequately opacified. No 


evidence of right heart strain. 











[facility] IMAGING


Patient Name:CHRISTIANO SILVERIO                      MR:[id number]                  

                 : 1950











The study is POSITIVE for nonocclusive thrombus in visualized portions of the 


left profunda femoris vein, age-indeterminate but new since prior study dated 


8/27/15.





To contact [facility] with a general question: [facility] - [phone]


For direct physician to physician contact: Physician Hotline - [phone]


[facility] ([facility] Facility ID #[id number])





____________________________________________________________


<Electronically signed by Sully Pizarro MD in OV>  19


Dictated By: Sully Pizarro MD


Dictated Date/Time: 19


Transcribed Date/Time:  


Copy to:











Assess/Plan/Problems-Billing


Assessment: 





This is a 68 year old female with history of diabetes, lymphedema, HLP, HTN 

that presented to ED with complaints of LE erythema and pain, admitted for 

cellulitis and new LLE DVT.








- Patient Problems


(1) Cellulitis


Code(s): L03.90 - CELLULITIS, UNSPECIFIED   SNOMED Code(s): 813868775


   Comment: 


 - With chronic lymphedema, leukocytosis and fever


 - Continue vanco and cefepime


 - Follow cultures   





(2) Deep vein thrombosis (DVT) of left lower extremity


Code(s): I82.402 - ACUTE EMBOLISM AND THOMBOS UNSP DEEP VEINS OF L LOW EXTREM   

SNOMED Code(s): 601394581


   Comment: 


 - LLE profunda femoris


 - Loading dose eliquis started 10mg BID for 7 days   





(3) Hypertension


Code(s): I10 - ESSENTIAL (PRIMARY) HYPERTENSION   SNOMED Code(s): 80123846


   Comment: 


 - Continue toprol 25mg daily home dose and valsartan   





(4) Montoya esophagus


Code(s): K22.70 - MONTOYA'S ESOPHAGUS WITHOUT DYSPLASIA   SNOMED Code(s): 

264740773


   Comment: 


 - Patient brought her dexilent from home


 - Had some nausea today and abdominal pain, one dose pepcid IV and zofran PRN 

  





(5) Diabetes


Code(s): E11.9 - TYPE 2 DIABETES MELLITUS WITHOUT COMPLICATIONS   SNOMED Code(s)

: [id number]


   Comment: 


 - Insulin dependent, prefers to take her insulin pens from home, pharmacy 

aware   


Status and Disposition: 





Inpatient, dispo to home when medically stable.

## 2019-07-05 LAB
ANION GAP SERPL CALC-SCNC: 7 MMOL/L (ref 2–11)
BASOPHILS # BLD AUTO: 0.1 10^3/UL (ref 0–0.2)
BUN SERPL-MCNC: 18 MG/DL (ref 6–24)
BUN/CREAT SERPL: 14.3 (ref 8–20)
CALCIUM SERPL-MCNC: 8.9 MG/DL (ref 8.6–10.3)
CHLORIDE SERPL-SCNC: 108 MMOL/L (ref 101–111)
EOSINOPHIL # BLD AUTO: 0.1 10^3/UL (ref 0–0.6)
GLUCOSE SERPL-MCNC: 140 MG/DL (ref 70–100)
HCO3 SERPL-SCNC: 21 MMOL/L (ref 22–32)
HCT VFR BLD AUTO: 33 % (ref 35–47)
HGB BLD-MCNC: 10.8 G/DL (ref 12–16)
LYMPHOCYTES # BLD AUTO: 1.2 10^3/UL (ref 1–4.8)
MCH RBC QN AUTO: 29 PG (ref 27–31)
MCHC RBC AUTO-ENTMCNC: 33 G/DL (ref 31–36)
MCV RBC AUTO: 88 FL (ref 80–97)
MONOCYTES # BLD AUTO: 0.7 10^3/UL (ref 0–0.8)
NEUTROPHILS # BLD AUTO: 13.9 10^3/UL (ref 1.5–7.7)
NRBC # BLD AUTO: 0 10^3/UL
NRBC BLD QL AUTO: 0
PLATELET # BLD AUTO: 168 10^3/UL (ref 150–450)
POTASSIUM SERPL-SCNC: 4 MMOL/L (ref 3.5–5)
RBC # BLD AUTO: 3.7 10^6 /UL (ref 3.7–4.87)
SODIUM SERPL-SCNC: 136 MMOL/L (ref 135–145)
WBC # BLD AUTO: 15.9 10^3/UL (ref 3.5–10.8)

## 2019-07-05 RX ADMIN — VALSARTAN SCH MG: 40 TABLET ORAL at 20:39

## 2019-07-05 RX ADMIN — INSULIN DETEMIR SCH UNIT: 100 INJECTION, SOLUTION SUBCUTANEOUS at 20:36

## 2019-07-05 RX ADMIN — INSULIN ASPART SCH: 100 INJECTION, SOLUTION INTRAVENOUS; SUBCUTANEOUS at 08:21

## 2019-07-05 RX ADMIN — INSULIN DETEMIR SCH UNIT: 100 INJECTION, SOLUTION SUBCUTANEOUS at 08:15

## 2019-07-05 RX ADMIN — INSULIN ASPART SCH: 100 INJECTION, SOLUTION INTRAVENOUS; SUBCUTANEOUS at 17:16

## 2019-07-05 RX ADMIN — VANCOMYCIN HYDROCHLORIDE SCH MLS/HR: 1 INJECTION, POWDER, LYOPHILIZED, FOR SOLUTION INTRAVENOUS at 00:08

## 2019-07-05 RX ADMIN — APIXABAN SCH MG: 5 TABLET, FILM COATED ORAL at 08:21

## 2019-07-05 RX ADMIN — THERA TABS SCH: TAB at 08:22

## 2019-07-05 RX ADMIN — INSULIN ASPART SCH: 100 INJECTION, SOLUTION INTRAVENOUS; SUBCUTANEOUS at 13:39

## 2019-07-05 RX ADMIN — VANCOMYCIN HYDROCHLORIDE SCH MLS/HR: 1 INJECTION, POWDER, LYOPHILIZED, FOR SOLUTION INTRAVENOUS at 12:17

## 2019-07-05 RX ADMIN — CEFEPIME HYDROCHLORIDE SCH MLS/HR: 1 INJECTION, SOLUTION INTRAVENOUS at 21:41

## 2019-07-05 RX ADMIN — INSULIN ASPART SCH UNIT: 100 INJECTION, SOLUTION INTRAVENOUS; SUBCUTANEOUS at 17:17

## 2019-07-05 RX ADMIN — INSULIN ASPART SCH: 100 INJECTION, SOLUTION INTRAVENOUS; SUBCUTANEOUS at 20:18

## 2019-07-05 RX ADMIN — APIXABAN SCH MG: 5 TABLET, FILM COATED ORAL at 20:39

## 2019-07-05 RX ADMIN — METOPROLOL SUCCINATE SCH MG: 25 TABLET, EXTENDED RELEASE ORAL at 08:20

## 2019-07-05 RX ADMIN — CEFEPIME HYDROCHLORIDE SCH MLS/HR: 1 INJECTION, SOLUTION INTRAVENOUS at 11:02

## 2019-07-05 RX ADMIN — SPIRONOLACTONE SCH MG: 25 TABLET ORAL at 17:16

## 2019-07-05 RX ADMIN — DEXLANSOPRAZOLE SCH MG: 30 CAPSULE, DELAYED RELEASE ORAL at 08:22

## 2019-07-05 NOTE — PN
Subjective


Date of Service: 19


Interval History: 





Patient seen and examined. States she is mildly SOB, had CXR early AM for same. 

Does appear to be volume overloaded. Denies fever or chills. No chest pain, no 

abdominal pain. leg pain improved.





Objective


Active Medications: 








Acetaminophen (Tylenol Tab*)  650 mg PO Q4H PRN


   PRN Reason: FEVER/PAIN


   Last Admin: 19 20:01 Dose:  650 mg


Al Hydrox/Mg Hydrox/Simethicone (Maalox Plus*)  30 ml PO Q2H PRN


   PRN Reason: DYSPEPSIA


   Last Admin: 19 20:06 Dose:  30 ml


Albuterol (Ventolin 2.5 Mg/3 Ml Neb.Sol*)  2.5 mg INH ONCE PRN


   PRN Reason: SHORTNESS OF BREATH


   Last Admin: 19 05:44 Dose:  2.5 mg


Apixaban (Eliquis*)  10 mg PO BID [facility]


   Stop: 07/10/19 09:01


   Last Admin: 19 08:21 Dose:  10 mg


Benzonatate (Tessalon Cap*)  100 mg PO Q6H PRN


   PRN Reason: COUGH


Dexlansoprazole (Dexilant (Nf))  30 mg PO DAILY [facility]


   Last Admin: 19 08:22 Dose:  30 mg


Cefepime HCl (Maxipime 1 Gm In Dextrose Duplex (*))  1 gm in 50 mls @ 100 mls/

hr IV Q12H [facility]


   Last Admin: 19 11:02 Dose:  100 mls/hr


Vancomycin HCl 1,000 mg/ (Sodium Chloride)  250 mls @ 166.667 mls/hr IVPB Q12H 

[facility]


   Last Admin: 19 12:17 Dose:  166.667 mls/hr


Insulin Aspart (Novolog (Nf))  0 - 10 unit SUBCUT ACHS [facility]; Protocol


   Last Admin: 19 17:16 Dose:  Not Given


Insulin Aspart (Novolog (Nf))  8 unit SUBCUT 0730,1130 [facility]


Insulin Aspart (Novolog (Nf))  12 unit SUBCUT 1630 [facility]


   Last Admin: 19 17:17 Dose:  12 unit


Insulin Detemir (Levemir (Nf))  20 unit SUBCUT BEDTIME [facility]


   Last Admin: 19 20:56 Dose:  20 unit


Insulin Detemir (Levemir (Nf))  16 unit SUBCUT 0900 [facility]


   Last Admin: 19 08:15 Dose:  16 unit


Metoprolol Succinate (Toprol Xl Tab*)  25 mg PO DAILY [facility]


   Last Admin: 19 08:20 Dose:  25 mg


Multivitamins/Minerals (Theragran/Minerals Tab*)  1 tab PO DAILY [facility]


   Last Admin: 19 08:22 Dose:  Not Given


Ondansetron HCl (Zofran Inj*)  4 mg IV Q6H PRN


   PRN Reason: NAUSEA


   Last Admin: 19 20:04 Dose:  4 mg


Pharmacy Consult (Vancomycin Per Pharmacy*)  1 note FOLLOW UP . PRN


   PRN Reason: PER PROTOCOL


Spironolactone (Aldactone Tab*)  25 mg PO DAILY [facility]


   Last Admin: 19 17:16 Dose:  25 mg


Valsartan (Diovan Tab*)  40 mg PO BEDTIME [facility]


   Last Admin: 19 20:45 Dose:  40 mg








 Vital Signs - 8 hr











  19





  11:19 15:15


 


Temperature 98.2 F 98.4 F


 


Pulse Rate 82 79


 


Respiratory 20 16





Rate  


 


Blood Pressure 136/63 145/49





(mmHg)  


 


O2 Sat by Pulse 92 90





Oximetry  











Oxygen Devices in Use Now: None


Appearance: alert, nad


Eyes: No Scleral Icterus, PERRLA


Ears/Nose/Mouth/Throat: NL Teeth, Lips, Gums, Mucous Membranes Moist


Neck: NL Appearance and Movements; NL JVP, Trachea Midline


Respiratory: - - tachypneic, increased WOB, bibasilar crackles


Cardiovascular: NL Sounds; No Murmurs; No JVD, RRR


Abdominal: NL Sounds; No Tenderness; No Distention


Skin: - - bilateral cellulitis LE, improving


Neurological: Alert and Oriented x 3


Nutrition: Taking PO's


Result Diagrams: 


 19 09:41





 19 09:41


Microbiology and Other Data: 


 Microbiology











 19 17:46 Aerobic Blood Culture - Preliminary





 Blood Venous    No Growth Day 1





 Anaerobic Blood Culture - Preliminary





    No Growth Day 1


 


 19 17:46 Aerobic Blood Culture - Preliminary





 Blood Venous    No Growth Day 1





 Anaerobic Blood Culture - Preliminary





    No Growth Day 1











Diagnostic Imaging: 





Patient Name:         CHRISTIANO SILVERIO                                            

                        Medical Record#: [id number]


Ordering Physician: Carli Wahl NP                                        

                        Acct.#: [id number]


:     1950         Age: 68   Sex: F                                   

                        Location: [address] [place] [address]/TELEMETRY


Exam Date: 19                                                       

                        ADM Status: ADM IN


Order Information:                         CTA CHEST


Accession Number:                          [id number]


CPT:                                       [id number]


EXAM: 


 CT Angiography Chest With Contrast 





EXAM DATE/TIME: 


 7/3/2019 10:10 PM 





CLINICAL HISTORY: 


 68 years old, female; Shortness of breath; Additional info: Chest pain, 


shortness of breath, left leg dvt 





TECHNIQUE: 


 Imaging protocol: Axial computed tomographic angiography images of the chest 


with intravenous contrast using CT angiography protocol. Coronal and sagittal 


reformatted images were created and reviewed. 


 3D rendering: MIP reconstructed images were created and reviewed. 


 Radiation optimization: All CT scans at this facility use at least one of 


these dose optimization techniques: automated exposure control; mA and/or kV 


adjustment per patient size (includes targeted exams where dose is matched to 


clinical indication); or iterative reconstruction. 


 Contrast material: VISI 320; Contrast volume: 85 ml; Contrast route: IV; 





COMPARISON: 


 OT CXR PORTAP CHEST AP OR PORT 7/3/2019 5:01 PM 





FINDINGS: 


 Pulmonary arteries: Images were acquired with contrast located in both the 


systemic and pulmonary arterial phase. No evidence of acute pulmonary embolic 


disease in the main or segmental pulmonary arteries. The distal subsegmental 


pulmonary arteries are difficult to assess due to the lack of intravenous 


contrast. 


 Aorta: Atheromatous changes involving the thoracic aorta. No aortic aneurysm 


or dissection. 


 Lungs: No pulmonary consolidation. 


 Pleural space: No pleural effusion. No pneumothorax. 


 Heart: The heart is of normal size. No pericardial thickening or effusion. 


Mild coronary calcification. 


 Mediastinum: Large hiatal hernia. No mediastinal mass. 


 Stomach and bowel: A portion of the stomach is located within the thoracic 


cage. 


 Lymph nodes: No mediastinal adenopathy. 


 Bones/joints: The thoracic cage is intact. Multilevel degenerative thoracic 


disc disease. No fracture of the thoracic vertebral bodies. No pathologic 


subluxation. 


 Soft tissues: Unremarkable. 





IMPRESSION: 


Less than optimal opacification of the pulmonary arterial system. No evidence 


of clot in the main pulmonary arteries or segmental pulmonary arteries. The 


distal subsegmental pulmonary arteries are not adequately opacified. No 


evidence of right heart strain. 











[facility] IMAGING


Patient Name:CHRISTIANO SILVERIO:[id number]                  

                 : 1950











The study is POSITIVE for nonocclusive thrombus in visualized portions of the 


left profunda femoris vein, age-indeterminate but new since prior study dated 


8/27/15.





To contact [facility] with a general question: Operations Center - [phone]


For direct physician to physician contact: Physician Hotline - [phone]


[facility] ([facility] Facility ID #[id number])





____________________________________________________________


<Electronically signed by Sully Pizarro MD in OV>  19


Dictated By: Sully Pizarro MD


Dictated Date/Time: 19


Transcribed Date/Time:  


Copy to:











Assess/Plan/Problems-Billing


Assessment: 





This is a 68 year old female with history of diabetes, lymphedema, HLP, HTN 

that presented to ED with complaints of LE erythema and pain, admitted for 

cellulitis and new LLE DVT.








- Patient Problems


(1) Cellulitis


Code(s): L03.90 - CELLULITIS, UNSPECIFIED   SNOMED Code(s): 686470576


   Comment: 


 - With chronic lymphedema, leukocytosis and fever all improving


 - Follow cultures, NTD


 - Will DC vanco, continue cefepime


   





(2) Deep vein thrombosis (DVT) of left lower extremity


Code(s): I82.402 - ACUTE EMBOLISM AND THOMBOS UNSP DEEP VEINS OF L LOW EXTREM   

SNOMED Code(s): 653739744


   Comment: 


 - LLE profunda femoris


 - Loading dose eliquis started 10mg BID for 7 days   





(3) Hypertension


Code(s): I10 - ESSENTIAL (PRIMARY) HYPERTENSION   SNOMED Code(s): 56791974


   Comment: 


 - Continue toprol 25mg daily home dose and valsartan   





(4) Montoya esophagus


Code(s): K22.70 - MONTOYA'S ESOPHAGUS WITHOUT DYSPLASIA   SNOMED Code(s): 

937353595


   Comment: 


 - Patient brought her dexilent from home


   





(5) Diabetes


Code(s): E11.9 - TYPE 2 DIABETES MELLITUS WITHOUT COMPLICATIONS   SNOMED Code(s)

: 24027882


   Comment: 


 - Insulin dependent, prefers to take her insulin pens from home, pharmacy 

aware and SS dose adjusted   





(6) Fluid overload


Code(s): E87.70 - FLUID OVERLOAD, UNSPECIFIED   SNOMED Code(s): 35337474


   Comment: 


 - 2/2 fluid resuscitation, one dose lasix this am, restart aldactone and 

continue to monitor respiratory status   


Status and Disposition: 





Inpatient, dispo to home when medically stable, 1-2 days

## 2019-07-06 LAB
BASOPHILS # BLD AUTO: 0.1 10^3/UL (ref 0–0.2)
EOSINOPHIL # BLD AUTO: 0.3 10^3/UL (ref 0–0.6)
HCT VFR BLD AUTO: 29 % (ref 35–47)
HGB BLD-MCNC: 10 G/DL (ref 12–16)
LYMPHOCYTES # BLD AUTO: 1.7 10^3/UL (ref 1–4.8)
MCH RBC QN AUTO: 30 PG (ref 27–31)
MCHC RBC AUTO-ENTMCNC: 34 G/DL (ref 31–36)
MCV RBC AUTO: 87 FL (ref 80–97)
MONOCYTES # BLD AUTO: 0.9 10^3/UL (ref 0–0.8)
NEUTROPHILS # BLD AUTO: 8.4 10^3/UL (ref 1.5–7.7)
NRBC # BLD AUTO: 0 10^3/UL
NRBC BLD QL AUTO: 0
PLATELET # BLD AUTO: 163 10^3/UL (ref 150–450)
RBC # BLD AUTO: 3.32 10^6 /UL (ref 3.7–4.87)
WBC # BLD AUTO: 11.4 10^3/UL (ref 3.5–10.8)

## 2019-07-06 RX ADMIN — APIXABAN SCH MG: 5 TABLET, FILM COATED ORAL at 21:41

## 2019-07-06 RX ADMIN — SPIRONOLACTONE SCH MG: 25 TABLET ORAL at 08:48

## 2019-07-06 RX ADMIN — APIXABAN SCH MG: 5 TABLET, FILM COATED ORAL at 08:44

## 2019-07-06 RX ADMIN — DEXLANSOPRAZOLE SCH MG: 30 CAPSULE, DELAYED RELEASE ORAL at 08:47

## 2019-07-06 RX ADMIN — VALSARTAN SCH MG: 40 TABLET ORAL at 21:42

## 2019-07-06 RX ADMIN — ACETAMINOPHEN PRN MG: 325 TABLET ORAL at 21:36

## 2019-07-06 RX ADMIN — INSULIN DETEMIR SCH: 100 INJECTION, SOLUTION SUBCUTANEOUS at 08:24

## 2019-07-06 RX ADMIN — CEFEPIME HYDROCHLORIDE SCH MLS/HR: 1 INJECTION, SOLUTION INTRAVENOUS at 10:32

## 2019-07-06 RX ADMIN — INSULIN ASPART SCH UNITS: 100 INJECTION, SOLUTION INTRAVENOUS; SUBCUTANEOUS at 21:53

## 2019-07-06 RX ADMIN — INSULIN ASPART SCH: 100 INJECTION, SOLUTION INTRAVENOUS; SUBCUTANEOUS at 17:40

## 2019-07-06 RX ADMIN — INSULIN ASPART SCH: 100 INJECTION, SOLUTION INTRAVENOUS; SUBCUTANEOUS at 08:23

## 2019-07-06 RX ADMIN — METOPROLOL SUCCINATE SCH MG: 25 TABLET, EXTENDED RELEASE ORAL at 08:47

## 2019-07-06 RX ADMIN — THERA TABS SCH TAB: TAB at 08:50

## 2019-07-06 RX ADMIN — CEFEPIME HYDROCHLORIDE SCH MLS/HR: 1 INJECTION, SOLUTION INTRAVENOUS at 23:28

## 2019-07-06 RX ADMIN — INSULIN ASPART SCH: 100 INJECTION, SOLUTION INTRAVENOUS; SUBCUTANEOUS at 18:42

## 2019-07-06 RX ADMIN — INSULIN ASPART SCH: 100 INJECTION, SOLUTION INTRAVENOUS; SUBCUTANEOUS at 13:19

## 2019-07-06 NOTE — PN
Subjective


Date of Service: 07/06/19


Interval History: 





Ms. Geronimo reports that she is feeling somewhat better but that she has 

significant pain in her legs, hips and back.  She does note chronic pain in 

these areas but feels that it is worse.  She denies any new pain to the left 

leg.  She believes that the redness to the right leg has improved but the 

redness to the left leg has worsened.  She denies other complaint including 

chest pain, SOB, nausea, or abdominal pain.











Objective


Active Medications: 





Acetaminophen (Tylenol Tab*)  650 mg PO Q4H PRN


Al Hydrox/Mg Hydrox/Simethicone (Maalox Plus*)  30 ml PO Q2H PRN


Albuterol (Ventolin 2.5 Mg/3 Ml Neb.Sol*)  2.5 mg INH ONCE PRN


Apixaban (Eliquis*)  10 mg PO BID [facility]


Benzonatate (Tessalon Cap*)  100 mg PO Q6H PRN


Dexlansoprazole (Dexilant (Nf))  30 mg PO DAILY [facility]


Cefepime HCl (Maxipime 1 Gm In Dextrose Duplex (*))  1 gm in 50 mls @ 100 mls/

hr IV Q12H [facility]


Insulin Aspart (Novolog (Nf))  0 - 10 unit SUBCUT ACHS [facility]; Protocol


Insulin Aspart (Novolog (Nf))  12 unit SUBCUT 1630 [facility]


Insulin Detemir (Levemir (Nf))  20 unit SUBCUT BEDTIME [facility]


Metoprolol Succinate (Toprol Xl Tab*)  25 mg PO DAILY [facility]


Multivitamins/Minerals (Theragran/Minerals Tab*)  1 tab PO DAILY [facility]


Ondansetron HCl (Zofran Inj*)  4 mg IV Q6H PRN


Spironolactone (Aldactone Tab*)  25 mg PO DAILY [facility]


Valsartan (Diovan Tab*)  40 mg PO BEDTIME [facility]





Vital Signs:











Temp Pulse Resp BP Pulse Ox


 


 98 F   62   20   128/55   93 


 


 07/06/19 07:17  07/06/19 07:17  07/06/19 07:17  07/06/19 07:17  07/06/19 07:17











Oxygen Devices in Use Now: None


Appearance: Female lying in bed in NAD


Eyes: No Scleral Icterus


Ears/Nose/Mouth/Throat: Mucous Membranes Moist


Neck: Trachea Midline


Respiratory: Symmetrical Chest Expansion and Respiratory Effort, Clear to 

Auscultation


Cardiovascular: - - +3 LE edema


Abdominal: NL Sounds; No Tenderness; No Distention, No Hepatosplenomegaly


Extremities: - - +3 LE edema


Skin: - - Multilple small ulcerations in various stages of healing to bilateral 

tibia, no drainage


Neurological: Alert and Oriented x 3, NL Muscle Strength and Tone


Nutrition: Taking PO's


Result Diagrams: 


 07/06/19 05:08





 07/05/19 09:41


Microbiology and Other Data: 


.


Diagnostic Imaging: 


.





Assess/Plan/Problems-Billing


Assessment: 





Ms. Geronimo is a 68 year old female with history of diabetes, lymphedema, HLP, 

HTN that presented to ED with complaints of LE erythema and pain, admitted for 

cellulitis and new LLE DVT.








- Patient Problems


(1) Cellulitis


Comment: 


 - With chronic lymphedema, leukocytosis and fever all improving


 - Follow cultures, NGTD


 - Continue cefepime   





(2) Deep vein thrombosis (DVT) of left lower extremity


Comment: 


 - LLE profunda femoris


 - Loading dose eliquis 10mg BID for 7 days   





(3) Drummond esophagus


Comment: 


 - Continue home dexilant.   





(4) Diabetes


Comment: 


 - Hypoglycemic to 40s overnight.


 - AM insulin held, will monitor and re-introduce insulin as appropriate.    





(5) Fluid overload


Comment: 


 - Resolved


 - 2/2 fluid resuscitation, one dose lasix yesterday   





(6) Hypertension


Comment: 


 - BP well controlled


 - Continue toprol 25mg daily home dose and valsartan   





(7) Lymphedema


SNOMED Code(s): 983546363


   Comment: 


- Elevate LEs


- Recommend follow up with [facility] or [facility].

   





(8) DVT prophylaxis


Comment: 


 - Continue eliquis as per above.   





(9) Full code status


Comment: 


   


Status and Disposition: 





Inpatient, dispo to home when medically stable, 1-2 days

## 2019-07-07 LAB — BUN SERPL-MCNC: 17 MG/DL (ref 6–24)

## 2019-07-07 RX ADMIN — APIXABAN SCH MG: 5 TABLET, FILM COATED ORAL at 09:43

## 2019-07-07 RX ADMIN — ACETAMINOPHEN PRN MG: 325 TABLET ORAL at 18:38

## 2019-07-07 RX ADMIN — INSULIN ASPART SCH: 100 INJECTION, SOLUTION INTRAVENOUS; SUBCUTANEOUS at 09:43

## 2019-07-07 RX ADMIN — DEXLANSOPRAZOLE SCH MG: 30 CAPSULE, DELAYED RELEASE ORAL at 09:28

## 2019-07-07 RX ADMIN — ACETAMINOPHEN PRN MG: 325 TABLET ORAL at 09:45

## 2019-07-07 RX ADMIN — INSULIN ASPART SCH: 100 INJECTION, SOLUTION INTRAVENOUS; SUBCUTANEOUS at 21:47

## 2019-07-07 RX ADMIN — SPIRONOLACTONE SCH MG: 25 TABLET ORAL at 09:44

## 2019-07-07 RX ADMIN — VALSARTAN SCH MG: 40 TABLET ORAL at 21:44

## 2019-07-07 RX ADMIN — INSULIN ASPART SCH UNITS: 100 INJECTION, SOLUTION INTRAVENOUS; SUBCUTANEOUS at 13:25

## 2019-07-07 RX ADMIN — INSULIN ASPART SCH UNITS: 100 INJECTION, SOLUTION INTRAVENOUS; SUBCUTANEOUS at 18:02

## 2019-07-07 RX ADMIN — METOPROLOL SUCCINATE SCH MG: 25 TABLET, EXTENDED RELEASE ORAL at 09:44

## 2019-07-07 RX ADMIN — BENZONATATE PRN MG: 100 CAPSULE ORAL at 09:46

## 2019-07-07 RX ADMIN — BENZONATATE PRN MG: 100 CAPSULE ORAL at 18:37

## 2019-07-07 RX ADMIN — CEFEPIME HYDROCHLORIDE SCH MLS/HR: 1 INJECTION, SOLUTION INTRAVENOUS at 09:31

## 2019-07-07 RX ADMIN — CEFEPIME HYDROCHLORIDE SCH MLS/HR: 1 INJECTION, SOLUTION INTRAVENOUS at 21:45

## 2019-07-07 RX ADMIN — THERA TABS SCH TAB: TAB at 09:44

## 2019-07-07 RX ADMIN — APIXABAN SCH MG: 5 TABLET, FILM COATED ORAL at 21:43

## 2019-07-07 NOTE — PN
Subjective


Date of Service: 07/07/19


Interval History: 





Ms. Geronimo reports that she has various complaints that are acute and more 

than are chronic.  Primarily she complains that she has a pain in her right 

groin.  She feels this pain when she coughs and when she lifted her leg to get 

into bed.  She notes that she was able to ambulate in the hallways yesterday.  

However, to ambulate she put on her pants and they were very tight around her 

calves and while pulling these off she developed a skin tear to the posterior 

calf. She notes that after ambulating her left leg was bright red and painful.  

After lying in bed overnight she notes that the redness and warmth is better.  





Objective


Active Medications: 





Acetaminophen (Tylenol Tab*)  650 mg PO Q4H PRN


Al Hydrox/Mg Hydrox/Simethicone (Maalox Plus*)  30 ml PO Q2H PRN


Albuterol (Ventolin 2.5 Mg/3 Ml Neb.Sol*)  2.5 mg INH ONCE PRN


Apixaban (Eliquis*)  10 mg PO BID [facility]


Benzonatate (Tessalon Cap*)  100 mg PO Q6H PRN


Dexlansoprazole (Dexilant (Nf))  30 mg PO DAILY [facility]


Cefepime HCl (Maxipime 1 Gm In Dextrose Duplex (*))  1 gm in 50 mls @ 100 mls/

hr IV Q12H [facility]


Insulin Aspart (Novolog (Nf))  0 - 10 unit SUBCUT ACHS [facility]; Protocol


Metoprolol Succinate (Toprol Xl Tab*)  25 mg PO DAILY [facility]


Multivitamins/Minerals (Theragran/Minerals Tab*)  1 tab PO DAILY [facility]


Ondansetron HCl (Zofran Inj*)  4 mg IV Q6H PRN


Spironolactone (Aldactone Tab*)  25 mg PO DAILY [facility]


Valsartan (Diovan Tab*)  40 mg PO BEDTIME [facility]





Vital Signs:











Temp Pulse Resp BP Pulse Ox


 


 97.4 F   62   18   118/61   98 


 


 07/07/19 03:15  07/07/19 03:15  07/07/19 03:15  07/07/19 03:15  07/07/19 03:15











Oxygen Devices in Use Now: None


Appearance: Female lying in bed in NAD


Eyes: No Scleral Icterus


Ears/Nose/Mouth/Throat: Mucous Membranes Moist


Neck: Trachea Midline


Respiratory: Symmetrical Chest Expansion and Respiratory Effort, Clear to 

Auscultation


Cardiovascular: NL Sounds; No Murmurs; No JVD, - - +3 edema


Abdominal: - - Soft, tenderness to deep palpation in Right groin


Extremities: - - +3 edema


Skin: - - area of skin breakdown to posterior calf, weeping serous fluid on the 

anterior aspect


Neurological: Alert and Oriented x 3, NL Muscle Strength and Tone


Nutrition: Taking PO's


Result Diagrams: 


 07/06/19 05:08





 07/07/19 10:23


Microbiology and Other Data: 


.


Diagnostic Imaging: 


.





Assess/Plan/Problems-Billing


Assessment: 





Ms. Geronimo is a 68 year old female with history of diabetes, lymphedema, HLP, 

HTN that presented to ED with complaints of LE erythema and pain, admitted for 

cellulitis and new LLE DVT.








- Patient Problems


(1) Cellulitis


Comment: 


 - With chronic lymphedema, leukocytosis and fever all improving


 - Follow cultures, NGTD


 - Switch to cefazolin, major issue in slow healing is patient's severe 

lymphedema, plan to more actively elevate leg today   





(2) Deep vein thrombosis (DVT) of left lower extremity


Comment: 


 - LLE superficial femoral vein


 - Loading dose eliquis 10mg BID for 7 days   





(3) Right groin pain


Comment: 


- Pain only with coughing or lifting leg


- Suspect in musculoskeletal, no evidence of hernia


- Will monitor   





(4) Drummond esophagus


Comment: 


 - Continue home dexilant.   





(5) Diabetes


Comment: 


- Hypoglycemic to 40s 7/6/19, BG 130s this AM


- Stop long acting insulin, plan to use SSI coverage only for now   





(6) Fluid overload


Comment: 


 - Resolved


 - 2/2 fluid resuscitation, one dose lasix    





(7) Hypertension


Comment: 


 - BP well controlled


 - Continue toprol 25mg daily home dose and valsartan   





(8) Lymphedema


SNOMED Code(s): 898140328


   Comment: 


- Elevate LEs


- Recommend follow up with [facility] or [facility].

   





(9) DVT prophylaxis


Comment: 


 - Continue eliquis as per above.   





(10) Full code status


Comment: 


   


Status and Disposition: 





Inpatient, dispo to home when medically stable

## 2019-07-08 LAB
ANION GAP SERPL CALC-SCNC: 7 MMOL/L (ref 2–11)
BASOPHILS # BLD AUTO: 0.1 10^3/UL (ref 0–0.2)
BUN SERPL-MCNC: 14 MG/DL (ref 6–24)
BUN/CREAT SERPL: 13.2 (ref 8–20)
CALCIUM SERPL-MCNC: 8.7 MG/DL (ref 8.6–10.3)
CHLORIDE SERPL-SCNC: 106 MMOL/L (ref 101–111)
EOSINOPHIL # BLD AUTO: 0.3 10^3/UL (ref 0–0.6)
GLUCOSE SERPL-MCNC: 208 MG/DL (ref 70–100)
HCO3 SERPL-SCNC: 25 MMOL/L (ref 22–32)
HCT VFR BLD AUTO: 31 % (ref 35–47)
HGB BLD-MCNC: 10.4 G/DL (ref 12–16)
LYMPHOCYTES # BLD AUTO: 1.9 10^3/UL (ref 1–4.8)
MAGNESIUM SERPL-MCNC: 2.3 MG/DL (ref 1.9–2.7)
MCH RBC QN AUTO: 29 PG (ref 27–31)
MCHC RBC AUTO-ENTMCNC: 34 G/DL (ref 31–36)
MCV RBC AUTO: 86 FL (ref 80–97)
MONOCYTES # BLD AUTO: 0.7 10^3/UL (ref 0–0.8)
NEUTROPHILS # BLD AUTO: 7.3 10^3/UL (ref 1.5–7.7)
NRBC # BLD AUTO: 0 10^3/UL
NRBC BLD QL AUTO: 0.1
PLATELET # BLD AUTO: 251 10^3/UL (ref 150–450)
POTASSIUM SERPL-SCNC: 3.6 MMOL/L (ref 3.5–5)
RBC # BLD AUTO: 3.54 10^6 /UL (ref 3.7–4.87)
SODIUM SERPL-SCNC: 138 MMOL/L (ref 135–145)
WBC # BLD AUTO: 10.4 10^3/UL (ref 3.5–10.8)

## 2019-07-08 RX ADMIN — INSULIN ASPART SCH UNITS: 100 INJECTION, SOLUTION INTRAVENOUS; SUBCUTANEOUS at 18:58

## 2019-07-08 RX ADMIN — DEXLANSOPRAZOLE SCH MG: 30 CAPSULE, DELAYED RELEASE ORAL at 08:06

## 2019-07-08 RX ADMIN — INSULIN ASPART SCH: 100 INJECTION, SOLUTION INTRAVENOUS; SUBCUTANEOUS at 15:04

## 2019-07-08 RX ADMIN — VALSARTAN SCH MG: 40 TABLET ORAL at 21:34

## 2019-07-08 RX ADMIN — CEFEPIME HYDROCHLORIDE SCH MLS/HR: 1 INJECTION, SOLUTION INTRAVENOUS at 10:34

## 2019-07-08 RX ADMIN — APIXABAN SCH MG: 5 TABLET, FILM COATED ORAL at 08:07

## 2019-07-08 RX ADMIN — METOPROLOL SUCCINATE SCH MG: 25 TABLET, EXTENDED RELEASE ORAL at 08:06

## 2019-07-08 RX ADMIN — THERA TABS SCH TAB: TAB at 08:07

## 2019-07-08 RX ADMIN — ACETAMINOPHEN PRN MG: 325 TABLET ORAL at 23:24

## 2019-07-08 RX ADMIN — APIXABAN SCH MG: 5 TABLET, FILM COATED ORAL at 21:34

## 2019-07-08 RX ADMIN — SPIRONOLACTONE SCH MG: 25 TABLET ORAL at 08:06

## 2019-07-08 RX ADMIN — INSULIN ASPART SCH UNITS: 100 INJECTION, SOLUTION INTRAVENOUS; SUBCUTANEOUS at 08:07

## 2019-07-08 RX ADMIN — CEFEPIME HYDROCHLORIDE SCH MLS/HR: 1 INJECTION, SOLUTION INTRAVENOUS at 21:34

## 2019-07-08 RX ADMIN — INSULIN ASPART SCH UNIT: 100 INJECTION, SOLUTION INTRAVENOUS; SUBCUTANEOUS at 18:59

## 2019-07-08 RX ADMIN — INSULIN ASPART SCH UNIT: 100 INJECTION, SOLUTION INTRAVENOUS; SUBCUTANEOUS at 15:01

## 2019-07-08 NOTE — PN
Subjective


Date of Service: 07/08/19


Interval History: 








Patient resting in bed on assessment. Patient reports she has had "multiple set 

backs", but when asked to clarify she reports struggles with getting her 

medications at the time she usually takes them at home and nurses assuming her 

previous shortness of breath was "anxiety". She denies any physical "set backs" 

at this time. 





She reports she does not feel safe for discharge until she has "answers" to why 

this happened. 





Patient reports she did have an episode of chest pain last evening, therefore, 

EKG was obtained and patient has has serial trops. Patient reports her chest 

pain felt more like "gerd" due to her "barrets" which she attributed to not 

getting her medications on time as she does at home. 





Patient currently denies cp, sob, palpitations, nausea, vomiting, fever, 

chills. 





Objective


Active Medications: 








Acetaminophen (Tylenol Tab*)  650 mg PO Q4H PRN


   PRN Reason: FEVER/PAIN


   Last Admin: 07/07/19 18:38 Dose:  650 mg


Al Hydrox/Mg Hydrox/Simethicone (Maalox Plus*)  30 ml PO Q2H PRN


   PRN Reason: DYSPEPSIA


   Last Admin: 07/04/19 20:06 Dose:  30 ml


Albuterol (Ventolin 2.5 Mg/3 Ml Neb.Sol*)  2.5 mg INH ONCE PRN


   PRN Reason: SHORTNESS OF BREATH


   Last Admin: 07/05/19 05:44 Dose:  2.5 mg


Apixaban (Eliquis*)  10 mg PO BID [facility]


   Stop: 07/10/19 09:01


   Last Admin: 07/08/19 08:07 Dose:  10 mg


Benzonatate (Tessalon Cap*)  100 mg PO Q6H PRN


   PRN Reason: COUGH


   Last Admin: 07/07/19 18:37 Dose:  100 mg


Dexlansoprazole (Dexilant (Nf))  30 mg PO DAILY [facility]


   Last Admin: 07/08/19 08:06 Dose:  30 mg


Cefepime HCl (Maxipime 1 Gm In Dextrose Duplex (*))  1 gm in 50 mls @ 100 mls/

hr IV Q12H [facility]


   Last Admin: 07/08/19 10:34 Dose:  100 mls/hr


Insulin Aspart (Novolog (Nf))  0 unit SUBCUT TID WITH MEALS [facility]


   Last Admin: 07/08/19 15:01 Dose:  6 unit


Insulin Aspart (Novolog (Nf))  8 unit SUBCUT BID@0800,1200 [facility]


Insulin Aspart (Novolog (Nf))  12 unit SUBCUT DAILY@1700 [facility]


Metoprolol Succinate (Toprol Xl Tab*)  25 mg PO DAILY [facility]


   Last Admin: 07/08/19 08:06 Dose:  25 mg


Multivitamins/Minerals (Theragran/Minerals Tab*)  1 tab PO DAILY [facility]


   Last Admin: 07/08/19 08:07 Dose:  1 tab


Ondansetron HCl (Zofran Inj*)  4 mg IV Q6H PRN


   PRN Reason: NAUSEA


   Last Admin: 07/04/19 20:04 Dose:  4 mg


Spironolactone (Aldactone Tab*)  25 mg PO DAILY [facility]


   Last Admin: 07/08/19 08:06 Dose:  25 mg


Valsartan (Diovan Tab*)  40 mg PO BEDTIME [facility]








 Vital Signs - 8 hr











  07/08/19 07/08/19





  11:31 15:14


 


Temperature 98.3 F 97.4 F


 


Pulse Rate 64 67


 


Respiratory 20 16





Rate  


 


Blood Pressure 149/56 151/58





(mmHg)  


 


O2 Sat by Pulse 97 98





Oximetry  











Oxygen Devices in Use Now: None


Appearance: Comfortable, NAD


Eyes: No Scleral Icterus


Ears/Nose/Mouth/Throat: Clear Oropharnyx, Mucous Membranes Moist


Neck: NL Appearance and Movements; NL JVP


Respiratory: Symmetrical Chest Expansion and Respiratory Effort, Clear to 

Auscultation


Cardiovascular: NL Sounds; No Murmurs; No JVD, - - Bilateral LE edema


Abdominal: NL Sounds; No Tenderness; No Distention


Lymphatic: No Cervical Adenopathy


Extremities: - - Bilateral LE redness with left worse than right. Left LE also 

feels warm to touch and slight redness has increased past previously marked 

boarder


Skin: - - As above


Neurological: Alert and Oriented x 3


Nutrition: Taking PO's


Result Diagrams: 


 07/08/19 10:11





 07/08/19 10:11


Additional Lab and Data: 





 Laboratory Results - last 24 hr











  07/07/19 07/08/19 07/08/19





  20:08 03:54 07:00


 


WBC   


 


RBC   


 


Hgb   


 


Hct   


 


MCV   


 


MCH   


 


MCHC   


 


RDW   


 


Plt Count   


 


MPV   


 


Neut % (Auto)   


 


Lymph % (Auto)   


 


Mono % (Auto)   


 


Eos % (Auto)   


 


Baso % (Auto)   


 


Absolute Neuts (auto)   


 


Absolute Lymphs (auto)   


 


Absolute Monos (auto)   


 


Absolute Eos (auto)   


 


Absolute Basos (auto)   


 


Absolute Nucleated RBC   


 


Nucleated RBC %   


 


Sodium   


 


Potassium   


 


Chloride   


 


Carbon Dioxide   


 


Anion Gap   


 


BUN   


 


Creatinine   


 


Est GFR ( Amer)   


 


Est GFR (Non-Af Amer)   


 


BUN/Creatinine Ratio   


 


Glucose   


 


POC Glucose (mg/dL)  148 H  


 


Calcium   


 


Magnesium   


 


Troponin I   0.00  0.00














  07/08/19 07/08/19 07/08/19





  07:21 10:11 10:11


 


WBC    10.4


 


RBC    3.54 L


 


Hgb    10.4 L


 


Hct    31 L


 


MCV    86


 


MCH    29


 


MCHC    34


 


RDW    13


 


Plt Count    251


 


MPV    9.9


 


Neut % (Auto)    70.4


 


Lymph % (Auto)    18.5


 


Mono % (Auto)    6.9


 


Eos % (Auto)    3.3


 


Baso % (Auto)    0.9


 


Absolute Neuts (auto)    7.3


 


Absolute Lymphs (auto)    1.9


 


Absolute Monos (auto)    0.7


 


Absolute Eos (auto)    0.3


 


Absolute Basos (auto)    0.1


 


Absolute Nucleated RBC    0.0


 


Nucleated RBC %    0.1


 


Sodium   


 


Potassium   


 


Chloride   


 


Carbon Dioxide   


 


Anion Gap   


 


BUN   


 


Creatinine   


 


Est GFR ( Amer)   


 


Est GFR (Non-Af Amer)   


 


BUN/Creatinine Ratio   


 


Glucose   


 


POC Glucose (mg/dL)  154 H  


 


Calcium   


 


Magnesium   


 


Troponin I   0.00 














  07/08/19 07/08/19 07/08/19





  10:11 12:04 15:02


 


WBC   


 


RBC   


 


Hgb   


 


Hct   


 


MCV   


 


MCH   


 


MCHC   


 


RDW   


 


Plt Count   


 


MPV   


 


Neut % (Auto)   


 


Lymph % (Auto)   


 


Mono % (Auto)   


 


Eos % (Auto)   


 


Baso % (Auto)   


 


Absolute Neuts (auto)   


 


Absolute Lymphs (auto)   


 


Absolute Monos (auto)   


 


Absolute Eos (auto)   


 


Absolute Basos (auto)   


 


Absolute Nucleated RBC   


 


Nucleated RBC %   


 


Sodium  138  


 


Potassium  3.6  


 


Chloride  106  


 


Carbon Dioxide  25  


 


Anion Gap  7  


 


BUN  14  


 


Creatinine  1.06 H  


 


Est GFR ( Amer)  62.4  


 


Est GFR (Non-Af Amer)  51.6  


 


BUN/Creatinine Ratio  13.2  


 


Glucose  208 H  


 


POC Glucose (mg/dL)   199 H  280 H


 


Calcium  8.7  


 


Magnesium  2.3  


 


Troponin I   














  07/08/19





  17:21


 


WBC 


 


RBC 


 


Hgb 


 


Hct 


 


MCV 


 


MCH 


 


MCHC 


 


RDW 


 


Plt Count 


 


MPV 


 


Neut % (Auto) 


 


Lymph % (Auto) 


 


Mono % (Auto) 


 


Eos % (Auto) 


 


Baso % (Auto) 


 


Absolute Neuts (auto) 


 


Absolute Lymphs (auto) 


 


Absolute Monos (auto) 


 


Absolute Eos (auto) 


 


Absolute Basos (auto) 


 


Absolute Nucleated RBC 


 


Nucleated RBC % 


 


Sodium 


 


Potassium 


 


Chloride 


 


Carbon Dioxide 


 


Anion Gap 


 


BUN 


 


Creatinine 


 


Est GFR ( Amer) 


 


Est GFR (Non-Af Amer) 


 


BUN/Creatinine Ratio 


 


Glucose 


 


POC Glucose (mg/dL)  163 H


 


Calcium 


 


Magnesium 


 


Troponin I 











Microbiology and Other Data: 





 Microbiology





07/03/19 17:46   Blood Venous   Aerobic Blood Culture - Final


                            No Growth Day 5


07/03/19 17:46   Blood Venous   Anaerobic Blood Culture - Final


                            No Growth Day 5


07/03/19 17:46   Blood Venous   Aerobic Blood Culture - Final


                            No Growth Day 5


07/03/19 17:46   Blood Venous   Anaerobic Blood Culture - Final


                            No Growth Day 5








Diagnostic Imaging: 


.





Assess/Plan/Problems-Billing


Assessment: 





Ms. Geronimo is a 68 year old female with history of diabetes, lymphedema, HLP, 

HTN that presented to ED with complaints of LE erythema and pain, admitted for 

cellulitis and new LLE DVT.








- Patient Problems


(1) Cellulitis


Comment: 


- Mild redness extending past previous marked line on LLE. Also area is warm to 

touch.


- ID consult requested. 


- Leukocytosis and fever improved


- Blood cultures, NGTD


- Cont Cefepime    





(2) Drummond esophagus


Comment: 


 - Continue home dexilant.   





(3) Deep vein thrombosis (DVT) of left lower extremity


Comment: 


 - LLE superficial femoral vein


 - Loading dose eliquis 10mg BID for 7 days   





(4) Diabetes


Comment: 


- Elevated blood sugars today.


- Restarted patient on home dosing of Aspart


- Hold patient's long acting insulin for now given pervious hypoglycemia


- Monitor closely   





(5) Fluid overload


Comment: 


 - Resolved


 - 2/2 fluid resuscitation, one dose lasix 


 - Cont home Aldactone


 - Monitor lytes   





(6) Hypertension


Comment: 


 - BP well controlled


 - Continue toprol 25mg daily home dose and valsartan   





(7) Lymphedema


Comment: 


- Elevate LEs


- Recommend follow up with [facility] or [facility].

   





(8) Right groin pain


Comment: 


- Pain only with coughing or lifting leg


- Suspect in musculoskeletal, no evidence of hernia


- Will monitor   





(9) DVT prophylaxis


Comment: 


 - Continue eliquis as per above.   





(10) Full code status


Comment: 


   


Status and Disposition: 





Inpatient, dispo to home when medically stable


Attending: Zaira Ramos

## 2019-07-09 LAB
ANION GAP SERPL CALC-SCNC: 8 MMOL/L (ref 2–11)
BASOPHILS # BLD AUTO: 0.1 10^3/UL (ref 0–0.2)
BUN SERPL-MCNC: 14 MG/DL (ref 6–24)
BUN/CREAT SERPL: 14.4 (ref 8–20)
CALCIUM SERPL-MCNC: 8.5 MG/DL (ref 8.6–10.3)
CHLORIDE SERPL-SCNC: 108 MMOL/L (ref 101–111)
EOSINOPHIL # BLD AUTO: 0.4 10^3/UL (ref 0–0.6)
GLUCOSE SERPL-MCNC: 155 MG/DL (ref 70–100)
HCO3 SERPL-SCNC: 24 MMOL/L (ref 22–32)
HCT VFR BLD AUTO: 30 % (ref 35–47)
HGB BLD-MCNC: 10 G/DL (ref 12–16)
LYMPHOCYTES # BLD AUTO: 2.4 10^3/UL (ref 1–4.8)
MCH RBC QN AUTO: 29 PG (ref 27–31)
MCHC RBC AUTO-ENTMCNC: 33 G/DL (ref 31–36)
MCV RBC AUTO: 88 FL (ref 80–97)
MONOCYTES # BLD AUTO: 0.9 10^3/UL (ref 0–0.8)
NEUTROPHILS # BLD AUTO: 8 10^3/UL (ref 1.5–7.7)
NRBC # BLD AUTO: 0 10^3/UL
NRBC BLD QL AUTO: 0.1
PLATELET # BLD AUTO: 250 10^3/UL (ref 150–450)
POTASSIUM SERPL-SCNC: 3.6 MMOL/L (ref 3.5–5)
RBC # BLD AUTO: 3.39 10^6 /UL (ref 3.7–4.87)
SODIUM SERPL-SCNC: 140 MMOL/L (ref 135–145)
WBC # BLD AUTO: 11.8 10^3/UL (ref 3.5–10.8)

## 2019-07-09 RX ADMIN — SPIRONOLACTONE SCH MG: 25 TABLET ORAL at 08:51

## 2019-07-09 RX ADMIN — INSULIN ASPART SCH UNIT: 100 INJECTION, SOLUTION INTRAVENOUS; SUBCUTANEOUS at 12:42

## 2019-07-09 RX ADMIN — INSULIN ASPART SCH UNIT: 100 INJECTION, SOLUTION INTRAVENOUS; SUBCUTANEOUS at 08:57

## 2019-07-09 RX ADMIN — VALSARTAN SCH MG: 40 TABLET ORAL at 20:26

## 2019-07-09 RX ADMIN — INSULIN ASPART SCH UNIT: 100 INJECTION, SOLUTION INTRAVENOUS; SUBCUTANEOUS at 12:41

## 2019-07-09 RX ADMIN — THERA TABS SCH TAB: TAB at 08:50

## 2019-07-09 RX ADMIN — APIXABAN SCH MG: 5 TABLET, FILM COATED ORAL at 20:21

## 2019-07-09 RX ADMIN — ACETAMINOPHEN PRN MG: 325 TABLET ORAL at 11:19

## 2019-07-09 RX ADMIN — METOPROLOL SUCCINATE SCH MG: 25 TABLET, EXTENDED RELEASE ORAL at 08:52

## 2019-07-09 RX ADMIN — CEFEPIME HYDROCHLORIDE SCH MLS/HR: 1 INJECTION, SOLUTION INTRAVENOUS at 10:17

## 2019-07-09 RX ADMIN — INSULIN ASPART SCH UNIT: 100 INJECTION, SOLUTION INTRAVENOUS; SUBCUTANEOUS at 08:58

## 2019-07-09 RX ADMIN — INSULIN ASPART SCH: 100 INJECTION, SOLUTION INTRAVENOUS; SUBCUTANEOUS at 18:06

## 2019-07-09 RX ADMIN — DEXLANSOPRAZOLE SCH MG: 30 CAPSULE, DELAYED RELEASE ORAL at 08:51

## 2019-07-09 RX ADMIN — INSULIN ASPART SCH UNIT: 100 INJECTION, SOLUTION INTRAVENOUS; SUBCUTANEOUS at 21:17

## 2019-07-09 RX ADMIN — APIXABAN SCH MG: 5 TABLET, FILM COATED ORAL at 08:50

## 2019-07-09 NOTE — PN
Subjective


Date of Service: 07/09/19


Interval History: 








Discussed patient and plan with Dr Hdez.





Evaluated patient at bedside. Reports she understands and agrees with plan. 

Denies pain, fever, chills, cp, sob.





Objective


Active Medications: 








Acetaminophen (Tylenol Tab*)  650 mg PO Q4H PRN


   PRN Reason: FEVER/PAIN


   Last Admin: 07/09/19 11:19 Dose:  650 mg


Al Hydrox/Mg Hydrox/Simethicone (Maalox Plus*)  30 ml PO Q2H PRN


   PRN Reason: DYSPEPSIA


   Last Admin: 07/04/19 20:06 Dose:  30 ml


Albuterol (Ventolin 2.5 Mg/3 Ml Neb.Sol*)  2.5 mg INH ONCE PRN


   PRN Reason: SHORTNESS OF BREATH


   Last Admin: 07/05/19 05:44 Dose:  2.5 mg


Apixaban (Eliquis*)  10 mg PO BID [facility]


   Stop: 07/10/19 09:01


   Last Admin: 07/09/19 08:50 Dose:  10 mg


Benzonatate (Tessalon Cap*)  100 mg PO Q6H PRN


   PRN Reason: COUGH


   Last Admin: 07/07/19 18:37 Dose:  100 mg


Dexlansoprazole (Dexilant (Nf))  30 mg PO DAILY [facility]


   Last Admin: 07/09/19 08:51 Dose:  30 mg


Clindamycin HCl/Dextrose (Cleocin 600 Mg Ivpremix(*) Sdv)  600 mg in 50 mls @ 

100 mls/hr IV Q8H [facility]


   Last Admin: 07/09/19 16:33 Dose:  100 mls/hr


Insulin Aspart (Novolog (Nf))  0 unit SUBCUT TID WITH MEALS [facility]


   Last Admin: 07/09/19 12:42 Dose:  4 unit


Insulin Aspart (Novolog (Nf))  8 unit SUBCUT BID@0800,1200 [facility]


   Last Admin: 07/09/19 12:41 Dose:  8 unit


Insulin Aspart (Novolog (Nf))  12 unit SUBCUT DAILY@1700 [facility]


   Last Admin: 07/08/19 18:58 Dose:  8 units


Metoprolol Succinate (Toprol Xl Tab*)  25 mg PO DAILY [facility]


   Last Admin: 07/09/19 08:52 Dose:  25 mg


Multivitamins/Minerals (Theragran/Minerals Tab*)  1 tab PO DAILY [facility]


   Last Admin: 07/09/19 08:50 Dose:  1 tab


Ondansetron HCl (Zofran Inj*)  4 mg IV Q6H PRN


   PRN Reason: NAUSEA


   Last Admin: 07/04/19 20:04 Dose:  4 mg


Spironolactone (Aldactone Tab*)  25 mg PO DAILY [facility]


   Last Admin: 07/09/19 08:51 Dose:  25 mg


Valsartan (Diovan Tab*)  40 mg PO BEDTIME [facility]


   Last Admin: 07/08/19 21:34 Dose:  40 mg








 Vital Signs - 8 hr











  07/09/19





  11:15


 


Temperature 97.5 F


 


Pulse Rate 64


 


Respiratory 16





Rate 


 


Blood Pressure 124/66





(mmHg) 


 


O2 Sat by Pulse 97





Oximetry 











Oxygen Devices in Use Now: None


Appearance: Comfortable, NAD


Eyes: No Scleral Icterus


Ears/Nose/Mouth/Throat: Clear Oropharnyx, Mucous Membranes Moist


Neck: NL Appearance and Movements; NL JVP


Respiratory: Symmetrical Chest Expansion and Respiratory Effort, Clear to 

Auscultation


Cardiovascular: NL Sounds; No Murmurs; No JVD, RRR, - - Bilateral LE edema. Hx 

of lymphedema


Abdominal: NL Sounds; No Tenderness; No Distention


Lymphatic: No Cervical Adenopathy


Extremities: No Clubbing, Cyanosis


Skin: - - Bilateral leg cellulitis. Intact bullae on left anterior and left 

posterior lower extremity


Neurological: Alert and Oriented x 3, NL Muscle Strength and Tone


Result Diagrams: 


 07/09/19 05:25





 07/09/19 05:25


Additional Lab and Data: 








 Laboratory Results - last 24 hr











  07/08/19 07/08/19 07/09/19





  17:21 21:30 05:25


 


WBC    11.8 H


 


RBC    3.39 L


 


Hgb    10.0 L


 


Hct    30 L


 


MCV    88


 


MCH    29


 


MCHC    33


 


RDW    14


 


Plt Count    250


 


MPV    10.0


 


Neut % (Auto)    67.5


 


Lymph % (Auto)    20.8


 


Mono % (Auto)    7.3


 


Eos % (Auto)    3.7


 


Baso % (Auto)    0.7


 


Absolute Neuts (auto)    8.0 H


 


Absolute Lymphs (auto)    2.4


 


Absolute Monos (auto)    0.9 H


 


Absolute Eos (auto)    0.4


 


Absolute Basos (auto)    0.1


 


Absolute Nucleated RBC    0.0


 


Nucleated RBC %    0.1


 


Sodium   


 


Potassium   


 


Chloride   


 


Carbon Dioxide   


 


Anion Gap   


 


BUN   


 


Creatinine   


 


Est GFR ( Amer)   


 


Est GFR (Non-Af Amer)   


 


BUN/Creatinine Ratio   


 


Glucose   


 


POC Glucose (mg/dL)  163 H  159 H 


 


Calcium   














  07/09/19 07/09/19 07/09/19





  05:25 07:26 11:22


 


WBC   


 


RBC   


 


Hgb   


 


Hct   


 


MCV   


 


MCH   


 


MCHC   


 


RDW   


 


Plt Count   


 


MPV   


 


Neut % (Auto)   


 


Lymph % (Auto)   


 


Mono % (Auto)   


 


Eos % (Auto)   


 


Baso % (Auto)   


 


Absolute Neuts (auto)   


 


Absolute Lymphs (auto)   


 


Absolute Monos (auto)   


 


Absolute Eos (auto)   


 


Absolute Basos (auto)   


 


Absolute Nucleated RBC   


 


Nucleated RBC %   


 


Sodium  140  


 


Potassium  3.6  


 


Chloride  108  


 


Carbon Dioxide  24  


 


Anion Gap  8  


 


BUN  14  


 


Creatinine  0.97 H  


 


Est GFR ( Amer)  69.1  


 


Est GFR (Non-Af Amer)  57.1  


 


BUN/Creatinine Ratio  14.4  


 


Glucose  155 H  


 


POC Glucose (mg/dL)   153 H  222 H


 


Calcium  8.5 L  





























Microbiology and Other Data: 








 Microbiology











 07/03/19 17:46 Aerobic Blood Culture - Final





 Blood Venous    No Growth Day 5





 Anaerobic Blood Culture - Final





    No Growth Day 5


 


 07/03/19 17:46 Aerobic Blood Culture - Final





 Blood Venous    No Growth Day 5





 Anaerobic Blood Culture - Final





    No Growth Day 5











Diagnostic Imaging: 


.





Assess/Plan/Problems-Billing


Assessment: 





Ms. Geronimo is a 68 year old female with history of diabetes, lymphedema, HLP, 

HTN that presented to ED with complaints of LE erythema and pain, admitted for 

cellulitis and new LLE DVT.








- Patient Problems


(1) Cellulitis


Comment: 


- Evaluated by wound consult today. Please see wound note


- Continues to have mild redness to bilateral LE. Bullae to LLE


- ID consulting. Abx to be changed to Clindamycin


- Leukocytosis improved, but slight increase today. Continue to monitor WBC


- Blood cultures, NGTD   





(2) Drummond esophagus


Comment: 


 - Continue home dexilant.   





(3) Deep vein thrombosis (DVT) of left lower extremity


Comment: 


 - LLE superficial femoral vein


 - Loading dose eliquis 10mg BID for 7 days than 5 mg BID   





(4) Diabetes


Comment: 


- Elevated blood sugars today.


- Restarted patient on home dosing of Aspart


- Hold patient's long acting insulin for now given pervious hypoglycemia


- Monitor closely   





(5) Fluid overload


Comment: 


 - Resolved


 - 2/2 fluid resuscitation, one dose lasix 


 - Cont home Aldactone


 - Monitor lytes   





(6) Hypertension


Comment: 


 - BP well controlled


 - Continue toprol 25mg daily home dose and valsartan   





(7) Lymphedema


Comment: 


- Elevate LEs


- Recommend follow up with [facility] or [facility].

   





(8) Right groin pain


Comment: 


- Pain only with coughing or lifting leg


- Suspect in musculoskeletal, no evidence of hernia


- Will monitor   





(9) DVT prophylaxis


Comment: 


 - Continue eliquis as per above.   





(10) Full code status


Comment: 


   


Status and Disposition: 





Inpatient, dispo to home when medically stable


Attending: Zaira Ramos

## 2019-07-09 NOTE — CONS
CONSULTATION REPORT:

 

DATE OF CONSULT:  07/09/19

 

REQUESTING PROVIDER:  Brunilda Worrell NP.

 

CONSULTING SERVICE:  Infectious Disease.

 

REASON FOR CONSULT:  Leg cellulitis.

 

IMPRESSION:

1.  Chronic lymphedema and right greater than left acute cellulitis, the right 
side is nearly resolved, the left is improving.  There are some bullae formation
, I suspect group A streptococcus.

2.  Left profunda femoris vein, nonocclusive thrombus.

3.  Insulin-dependent diabetes mellitus.

4.  Obesity.

 

RECOMMENDATIONS:  We will change her antibiotics to clindamycin 600 mg IV every 
8 hours while she is here and as long as she is still continuing by tomorrow 
might be able to change her to oral antibiotics to complete a course here.  For 
now, we will plan on keeping her legs clean with soap and water, moisturizing 
as able and Ace wrap.

 

HISTORY OF PRESENT ILLNESS:  This is a 68-year-old woman with lymphedema and 
venous insufficiency, had had a left anterior leg cellulitis about 2 years ago.
  About a week ago, she developed fever, chills, sweats, and left greater than 
right leg redness and swelling.  Because of the symptoms, she was routed to the 
emergency room by her primary's office.  At admission, white count was 18,000.  
She had a fever of 103 at home, none here.  Ultrasound report showed DVT as 
noted above.  She was started on Eliquis.  The redness in the right leg has 
gotten much better over the last couple of days, the left leg is slower to 
improve and has been more swollen and then seemed to get worse overnight with 
worsening redness in the back of the leg, but that has also started to fade 
today as well.

 

PAST MEDICAL HISTORY:

1.  Insulin-dependent diabetes mellitus.

2.  Obesity.

3.  Irritable bowel syndrome.

4.  Hypertension.

5.  Gastroesophageal reflux disease.

6.  Drummond esophagus.

7.  Lymphedema.

8.  Status post hysterectomy.

9.  Status post breast reduction.

10.  Status post excision of spinal cyst and fusion in the lumbar and sacral 
spine.

 

MEDICATIONS:

1.  Tylenol.

2.  Albuterol as needed.

3.  Apixaban.

4.  Cefepime 1 g every 12 hours.

5.  Dexlansoprazole.

6.  Insulin aspart.

7.  Metoprolol.

8.  Multivitamin.

9.  Spironolactone.

10.  Valsartan.

 

ALLERGIES:  IBUPROFEN, METFORMIN, NSAIDs.

 

FAMILY HISTORY: Father had coronary disease and a bypass at age 85. Mother had 
skin cancer, basal cell carcinoma.

 

SOCIAL HISTORY:  She lives in [place].  She is a nonsmoker.  She has no sick 
contacts.

 

REVIEW OF SYSTEMS:  A 12-point review was all negative except as noted above in 
the history of present illness.

 

PHYSICAL EXAM:  Vital Signs:  Temperature 36.4, heart rate 64, respiratory rate 
16, blood pressure 124/66, oxygen saturation 97% on room air.  General:  She is 
awake, not in distress.  Neurologic:  She is oriented x3.  Follows all 
commands. Sensation is decreased to light touch in both feet.  HEENT:  There is 
no conjunctival hemorrhage.  Oropharynx without lesions.  Neck:  Neck is supple 
without mass.  Heart is regular rate and rhythm without murmurs, rubs, or 
gallops. Lungs are clear to auscultation bilaterally.  Abdomen:  Soft, nontender
, nondistended.  There are bowel sounds present.  Skin:  There is no splinter 
hemorrhage.  There are a couple of small right lower anterior leg eschar with 
surrounding mild erythema.  There are larger left anterior leg eschar and 
superficial bullae with some surrounding erythema.  There is left calf more 
intense erythema and induration with some tenderness.  No fluctuance or 
crepitans.

 

DIAGNOSTIC STUDIES/LAB DATA:  White blood cell count 11.8, hemoglobin 10, 
platelets 250, creatinine 0.9.

 

Please see impressions and recommendations outlined above, which I have 
discussed with Brunilda Worrell NP.

 

Thanks for asking me to see Ms. Geronimo in consultation.

 

 [id number]/[facility] #: [id number]

[facility]

## 2019-07-09 NOTE — CONSULT
Subjective


Date of Service: 19


Interval History: 





Ms. Geronimo is a 69 yo female with PMH significant for DM2, morbid obesity, IBS

, HTN, GERD, Drummond's esophagus, bilateral LE lymphedema, and venous 

insufficiency. She presented to the emergency room with complaints of fever, 

chills, and increased redness and swelling of her right leg. She was admitted 

to the hospital with bilateral LE cellulitis. 





She reports that the wounds to her left leg have been present for about 1 week, 

and the other leg with open areas for "sometime". She has had bilateral LE 

edema since the  of . She has been seen by the local lymphedema clinic 

in the past. She has lymphedema "pumps" at home. 





She feels like the right LE wounds are improving, but the left leg continues to 

be about the same. She reports a sharp pain in both legs, with the left leg 

worse than the right. 





Patient seen and examined at bedside. 








Family History: Unchanged from Admission


Social History: Unchanged from Admission


Past Medical History: Unchanged from Admission





Review of Systems





- Measurements


Intake and Output: 


Intake and Output Last 24 Hours











 07/07/19 07/08/19 07/09/19 07/10/19





 06:59 06:59 06:59 06:59


 


Intake Total 615 1570 890 770


 


Output Total 0 0 0 0


 


Balance 615 1570 890 770


 


Weight   230 lb 11.2 oz 


 


Intake:    


 


  IV Fluids 30 200  


 


    ABX - CEFEPIME 10 100  


 


    ABX - VANCOMYCIN 0   


 


    NS (0.9%) 20 100  


 


  IVPB 105 50 50 50


 


    ABX - CEFEPIME 105 50 50 50


 


    ABX - VANCOMYCIN 0   


 


    NS (0.9%) 0   


 


  Oral 480 1320 840 720


 


Output:    


 


  Urine 0 0 0 0














- Review of Systems


Constitutional Symptoms: 


   Negative: Fever, Other - Chills


Endocrinology: Positive: Obesity, Diabetes Mellitus


Hematologic/Lymphatic: Positive: Other - Bilateral LE lymphedema





Objective


Active Medications: 





Acetaminophen (Tylenol Tab*)  650 mg PO Q4H PRN Reason: FEVER/PAIN


Al Hydrox/Mg Hydrox/Simethicone (Maalox Plus*)  30 ml PO Q2H PRN Reason: 

DYSPEPSIA


Albuterol (Ventolin 2.5 Mg/3 Ml Neb.Sol*)  2.5 mg INH ONCE PRN Reason: 

SHORTNESS OF BREATH


Apixaban (Eliquis*)  10 mg PO BID LISA


   Stop: 07/10/19 09:01


Benzonatate (Tessalon Cap*)  100 mg PO Q6H PRN Reason: COUGH


Dexlansoprazole (Dexilant (Nf))  30 mg PO DAILY [facility]


Clindamycin HCl/Dextrose (Cleocin 600 Mg Ivpremix(*) Sdv)  600 mg in 50 mls @ 

100 mls/hr IV Q8H [facility]


Insulin Aspart (Novolog (Nf))  0 unit SUBCUT TID WITH MEALS [facility]


Insulin Aspart (Novolog (Nf))  8 unit SUBCUT BID@0800,1200 [facility]


Insulin Aspart (Novolog (Nf))  12 unit SUBCUT DAILY@1700 [facility]


Metoprolol Succinate (Toprol Xl Tab*)  25 mg PO DAILY [facility]


Multivitamins/Minerals (Theragran/Minerals Tab*)  1 tab PO DAILY [facility]


Ondansetron HCl (Zofran Inj*)  4 mg IV Q6H PRN Reason: NAUSEA


Spironolactone (Aldactone Tab*)  25 mg PO DAILY [facility]


Valsartan (Diovan Tab*)  40 mg PO BEDTIME [facility]





 Vital Signs - 8 hr











  19





  11:15


 


Temperature 97.5 F


 


Pulse Rate 64


 


Respiratory 16





Rate 


 


Blood Pressure 124/66





(mmHg) 


 


O2 Sat by Pulse 97





Oximetry 











Oxygen Devices in Use Now: None


Appearance: NAD, sitting up in bed


Ears/Nose/Mouth/Throat: Mucous Membranes Moist


Respiratory: Symmetrical Chest Expansion and Respiratory Effort


Extremities: - - Bilateral LE lymphedema


Skin: - - See skin note below


Neurological: Alert and Oriented x 3


Nutrition: Taking PO's


Result Diagrams: 


 07/10/19 05:31





 19 05:25


Additional Lab and Data: 





Above labs were pulled into the note when the note was edited prior to signing, 

see labs from day of consult below





 Laboratory Tests











  19





  05:25 05:25


 


WBC  11.8 H 


 


Hgb  10.0 L 


 


Hct  30 L 


 


Plt Count  250 


 


Sodium   140


 


Potassium   3.6


 


Chloride   108


 


Carbon Dioxide   24


 


BUN   14


 


Creatinine   0.97 H


 


Glucose   155 H











Microbiology and Other Data: 








 Microbiology











 19 17:46 Aerobic Blood Culture - Final





 Blood Venous    No Growth Day 5





 Anaerobic Blood Culture - Final





    No Growth Day 5


 


 19 17:46 Aerobic Blood Culture - Final





 Blood Venous    No Growth Day 5





 Anaerobic Blood Culture - Final





    No Growth Day 5











Diagnostic Imagin. Exam Date: 19 1732 - VL LOWER EXT VEINS LEFT





IMPRESSION: The study is POSITIVE for nonocclusive thrombus in visualized 

portions of the left profunda femoris vein, age-indeterminate but new since 

prior study dated 


8/27/15.





2. Exam Date: 194 - VL LOWER EXT VEINS RIGHT





IMPRESSION: Abnormal study. The mid to distal femoral veins are 

noncompressible. Flow and augmentation is seen. This may represent a partially 

occluded vein.








Skin Deviation Note





- Skin Deviation Findings





Anterior left leg - There is a superfical open area and a large bullae. The 

bullae measures 15.5 cm x 7.5 cm. The small superficial area measures, 1.8 cm x 

1.5 cm x 0.1 cm. The wound base is pink granulation tissue. There is currently 

no drainage from the leg. The surrounding skin with erythema. 


Left posterior leg - There is a large bullae. The bullae measures 17.5 cm x 7 

cm. There is a superficial area near the ankle, this area was not measured. The 

wound base is pink granulation tissue. There is currently no drainage from the 

leg. The surrounding skin with erythema. 


Right anterior lower leg - There are multiple small scabbed areas. The 

surrounding skin is intact but dry. 





Assessment/Plan:


Ms. Geronimo is a 69 yo female with PMH significant for DM2, morbid obesity, IBS

, HTN, GERD, Drummond's esophagus, bilateral LE lymphedema, and venous 

insufficiency. She presented to the emergency room with complaints of fever, 

chills, and increased redness and swelling of her right leg. She was admitted 

to the hospital with bilateral LE cellulitis.





1. Bilateral LE celluitis and lymphedema with bullae to the left LE. Management 

of cellulitis per ID/primary medicine team. Recommend washing the legs with 

soap and water. Apply lotion to the intact skin. Do not cover the legs with a 

dressing. If the bullae open up and there is a significant amount of weeping, 

recommend applying calcium alginate to the open areas, followed by rolled gauze

, change the dressings every other day or as needed for drainage. Recommend 

referral to lymph edema clinic (she is going to go to [place])


2. DM2. No recent HgA1C in the EMR. Recommend maintaining good glycemic control 

to allow for wound healing. 


3. Morbid obesity. BMI ~39.


4. Diet. Consistent Carbohydrate diet.


5. Code Status. Full Code Status.


6. Disposition. Inpatient, disposition per primary medicine team. 





TIME SPENT: Time for this wound consultation was 40 minutes and 30 minutes was 

spent with the patient discussing past medical history; assessing, measuring, 

and photographing the wounds. 








Wound Problem/Plan


Is Patient a Wound Clinic Patient: No


Attending: Sunitha Maciel

## 2019-07-10 VITALS — SYSTOLIC BLOOD PRESSURE: 136 MMHG | DIASTOLIC BLOOD PRESSURE: 68 MMHG

## 2019-07-10 LAB
BASOPHILS # BLD AUTO: 0.1 10^3/UL (ref 0–0.2)
EOSINOPHIL # BLD AUTO: 0.5 10^3/UL (ref 0–0.6)
HCT VFR BLD AUTO: 30 % (ref 35–47)
HGB BLD-MCNC: 10.3 G/DL (ref 12–16)
LYMPHOCYTES # BLD AUTO: 2.2 10^3/UL (ref 1–4.8)
MCH RBC QN AUTO: 30 PG (ref 27–31)
MCHC RBC AUTO-ENTMCNC: 34 G/DL (ref 31–36)
MCV RBC AUTO: 87 FL (ref 80–97)
MONOCYTES # BLD AUTO: 0.8 10^3/UL (ref 0–0.8)
NEUTROPHILS # BLD AUTO: 7.2 10^3/UL (ref 1.5–7.7)
NRBC # BLD AUTO: 0 10^3/UL
NRBC BLD QL AUTO: 0.1
PLATELET # BLD AUTO: 263 10^3/UL (ref 150–450)
RBC # BLD AUTO: 3.48 10^6 /UL (ref 3.7–4.87)
WBC # BLD AUTO: 10.7 10^3/UL (ref 3.5–10.8)

## 2019-07-10 RX ADMIN — ACETAMINOPHEN PRN MG: 325 TABLET ORAL at 12:05

## 2019-07-10 RX ADMIN — INSULIN ASPART SCH UNIT: 100 INJECTION, SOLUTION INTRAVENOUS; SUBCUTANEOUS at 08:51

## 2019-07-10 RX ADMIN — INSULIN ASPART SCH: 100 INJECTION, SOLUTION INTRAVENOUS; SUBCUTANEOUS at 13:38

## 2019-07-10 RX ADMIN — METOPROLOL SUCCINATE SCH MG: 25 TABLET, EXTENDED RELEASE ORAL at 08:49

## 2019-07-10 RX ADMIN — THERA TABS SCH TAB: TAB at 08:50

## 2019-07-10 RX ADMIN — INSULIN ASPART SCH: 100 INJECTION, SOLUTION INTRAVENOUS; SUBCUTANEOUS at 13:35

## 2019-07-10 RX ADMIN — INSULIN ASPART SCH: 100 INJECTION, SOLUTION INTRAVENOUS; SUBCUTANEOUS at 09:02

## 2019-07-10 RX ADMIN — DEXLANSOPRAZOLE SCH MG: 30 CAPSULE, DELAYED RELEASE ORAL at 08:49

## 2019-07-10 RX ADMIN — ACETAMINOPHEN PRN MG: 325 TABLET ORAL at 00:00

## 2019-07-10 RX ADMIN — APIXABAN SCH MG: 5 TABLET, FILM COATED ORAL at 08:50

## 2019-07-10 RX ADMIN — SPIRONOLACTONE SCH MG: 25 TABLET ORAL at 08:50

## 2019-07-10 NOTE — DS
CC:  Dr. Jolly *

 

DISCHARGE SUMMARY:

 

DATE OF ADMISSION:  07/03/19

 

DATE OF DISCHARGE:  07/10/19

 

PRIMARY CARE PROVIDER:  Dr. Jolly.

 

ATTENDING PHYSICIAN:  Dr. Ramos * (dictated by Ana Liu NP).

 

PRIMARY DIAGNOSES:

1.  Cellulitis.

2.  Left leg deep vein thrombosis.

3.  Diabetes.

4.  Hypertension.

5.  Edema.

6.  Gastroesophageal reflux disease.

7.  Fluid overload.

8.  Hypertension.

 

SECONDARY DIAGNOSIS:  Spastic colon.

 

CONSULTATIONS WHILE IN THE HOSPITAL:  Dr. Moshe Hdez, Infectious Diseases.

 

STUDIES WHILE IN THE HOSPITAL:

1.  EKG:  Impression:  Sinus rhythm.

2.  Chest x-ray:  Impression:  No active cardiopulmonary disease is noted.

3.  Venous Doppler study:  Impression:  Study positive for nonocclusive 
thrombus in visualized portions of the left profunda femoris vein.

4.  Chest/thorax CTA:  Impression:  Less than optimal opacification of 
pulmonary artery system.  No evidence of clots in the main arteries or 
segmental pulmonary arteries.  The distal segmental pulmonary arteries are not _
_____ opacified.  No evidence of right heart strain.

5.  Venous Doppler:  Impression:  Abnormal study.  [id number] femoral veins 
are noncompressible.  Flow augmentation is seen.  This may represent partially 
occluded vein.

6.  Repeat chest x-ray, 07/05/19:  Impression:  The constellation of findings 
is most suggestive of pulmonary vascular congestion and interstitial edema.

7.  Repeat chest x-ray, 07/08/19:  Impression:  No active cardiopulmonary 
disease.

 

DISCHARGE HOME MEDICATIONS:  Continued home medications:

1.  Aldactone 25 mg p.o. daily.

2.  Toprol 12.5 mg p.o. daily.

3.  Dexilant 30 mg p.o. daily.

4.  Diovan 40 mg p.o. daily.

5.  Torsemide 10 mg p.o. daily.

6.  NovoLog sliding scale.

7.  Levemir 16 units in the a.m. and 20 units in the p.m.

 

[place] Medication:

1.  Clindamycin 300 mg t.i.d. for 7 days.

2.  Eliquis 5 mg p.o. b.i.d.

 

Changed home medications:  No home medications changed.

 

Discontinued home medications:  No home medications discontinued.

 

HISTORY OF PRESENT ILLNESS/HOSPITAL COURSE:  Mrs. Geronimo is a 68-year-old 
female with a past medical history significant for hypertension, GERD, Drummond'
s esophagus, edema, diabetes, who presented to the emergency department on 07/03
/19 with complaints of fever and lower extremity redness and swelling.  Please 
see history and physical dictated by Carli Wahl NP, for complete summary 
of the events leading up to the hospitalization, but in short, the patient 
presented with the above-mentioned complaints and reportedly these have been 
present for approximately 1 week and progressively getting worse.  While in the 
emergency room, the patient was found to have a white count of 18.2, fever of 
102, and a positive lower leg DVT.  Given these findings, the patient was 
admitted to the hospital for further evaluation and treatment.

 

While hospitalized, the patient has been on the telemetry floor.  She has been 
treated with antibiotics for her cellulitis.  She was initially started on 
vanco and cefepime and antibiotics were narrowed to cefepime only.  Given the 
patient's complexity, Dr. Moshe Hdez from Infectious Diseases was 
consulted and he recommended clindamycin only and, therefore, the patient was 
transitioned to clindamycin.  The redness in the patient's bilateral legs have 
improved, as evidenced by receding from previously marked border.  The patient 
does appear to have bullae on her left lower extremity, anteriorly and 
posteriorly.  Given these findings, the patient was also evaluated by the wound 
care provider who recommended soap and water, elevation, leaving legs open to 
air.

 

As mentioned above in studies, the patient did have an ultrasound of her lower 
extremity which revealed a left lower leg DVT.  The patient was started on 
Eliquis 10 mg b.i.d. and has completed as 7-day course of 10 mg b.i.d.  
Therefore, she will be discharged with 5 mg b.i.d.

 

The patient's hospitalization was complicated by the fact that she had an 
episode of fluid-volume overload secondary to fluid resuscitation.  The patient 
required one dose of Lasix and then resumed her Aldactone and she has improved 
greatly.  As mentioned above, chest x-ray improved, the patient is on room air, 
the patient has no shortness of breath.

 

The patient is stable for discharge home today.

 

Vital Signs:  Temp 97.7, HR 69, RR 20, O2 saturation 98% on room air, /72.

 

REVIEW OF SYSTEMS:  The patient denies fever, chills, pain, chest pain, 
shortness of breath, nausea, vomiting, diarrhea.  A 14-point review of systems 
was completed and all were negative.

 

PHYSICAL EXAMINATION:  General:  Mrs. Geronimo is a 68-year-old female who is 
sitting in bed.  Appears to be in no acute distress.  Appears stated age.  HEENT
: EOMs intact.  PERRLA.  Oral mucosa is moist without lesion.  Posterior 
oropharynx is clear.  Neck:  Supple.  No lymphadenopathy.  Cardiac:  S1 and S2 
present.  No murmurs, rubs or gallops.  Regular rate and rhythm.  Respiratory:  
Lungs are clear to auscultation.  Good aeration.  No wheezes, rhonchi or rubs.  
Abdomen:  Soft and nontender.  Bowel sounds normoactive.  Extremities:  The 
patient has bilateral lower extremity edema.  No clubbing or cyanosis.  Pedal 
pulses are 2+ bilaterally. Musculoskeletal:  No pain or deformities.  Skin:  As 
mentioned in HPI, the patient does have redness to bilateral lower extremities 
that has greatly improved and is now only slightly red.  It has also receded 
from the previously marked border.  The patient also has bullae noted on her 
left anterior shin and left posterior calf. These are intact.  Neuro:  Exam is 
grossly intact.  No focal  deficits or weakness.

 

DIAGNOSTIC STUDIES/LABORATORY DATA:  WBC 10.7, hemoglobin 10.3, hematocrit 30, 
platelets 263.  Sodium 140, potassium 3.6, chloride 108, carbon dioxide 24, BUN 
14, creatinine of 0.97, glucose 155. 



DISCHARGE PLAN/FOLLOWUP:

1.  Cellulitis:  As mentioned in the HPI, the patient was evaluated by Wound 
Care and Dr. Moshe Hdez.  Wound Care recommends the patient washing legs 
with soap and water and applying lotion to intact skin only and do not cover 
leg with dressing.  Dr. Moshe Hdez has recommended the patient be 
discharged on clindamycin 300 mg t.i.d. 

for 7 days.  The patient is to follow up with Dr. Moshe Hdez and his 
office will facilitate this.

2.  DVT:  The patient has a left lower extremity DVT and was loaded with 10 mg 
of Eliquis b.i.d. for 7 days.  The patient will continue Eliquis at 5 mg p.o. 
b.i.d. Today, I would recommend 3 months' duration, but I will defer the final 
decision to be made with the patient and her primary care provider.  I will 
send the patient a prescription of Eliquis 5 mg p.o. b.i.d. x1 month and 
provide her with a refill just in case she cannot get into her primary care 
provider.

3.  Fluid overload:  As mentioned above, the patient did have 1 episode of 
fluid overload that was resolved with 1 dose of Lasix and the continuation of 
her home diuretics.  I have encouraged the patient to monitor weights and 
continue her home medication.  I have encouraged the patient to follow up with 
her primary care. Given her need for Lasix and her home diuretics, I would 
recommended a repeat BNP.

4.  Diabetes:  The patient can resume her home medications and followup with 
her primary care.

5.  Gastroesophageal reflux disease/Drummond's esophagus:  The patient should 
continue her home Dexilant.

6.  Hypertension:  The patient is well controlled and she should continue her 
home medications of Toprol and valsartan.

7.  Edema/lymphedema:  As mentioned above, the patient does have edema which is 
chronic for her.  We are recommending followup with [facility] and [facility].  There was also discussion with Dr. Moshe Hdez that he would be referring her to a local lymphedema provider.  I will 
defer this to Dr. Moshe Hdez at his followup with the patient.

8.  Spastic colon:  The patient should continue her home medication and 
followup with her primary care as needed.

9.  Followup:  The patient should follow up with her primary care in 1 to 3 
days. The patient should follow up with Dr. Moshe Hdez and his office 
will facilitate this.

10.  Education:  The patient and  were educated on signs and symptoms of 
any worsening condition and when to return to the emergency department.  Both 
stated understanding.

 

This is a summarized report of a complex medical history and hospital stay.  
For further details please see entire medical record.

 

TIME SPENT:  Approximately 35 minutes were spent on this discharge, greater 
than half of that time was spent face-to-face with the patient and her , 
discussing discharge plans and instructions.

 

This plan was discussed with my attending, Dr. Ramos, who is in agreement with 
my plan of care.

 

____________________________________ ANA LIU, ALY

 

[id number]/[facility] #: [id number]

ANJELICA

## 2019-07-10 NOTE — PN
Progress Note





- Progress Note


Date of Service: 07/10/19


SOAP: 


Subjective:


CC: cellulitis


HPI: 68 year old woman with lymphedema and Left leg cellulitis which is slowly 

improving.  No fever, rash, or diarrhea.  Appetite is good. 








Objective:





 Vital Signs











Temp  36.4 C   07/10/19 03:07


 


Pulse  66   07/10/19 03:07


 


Resp  18   07/10/19 03:07


 


BP  138/52   07/10/19 03:07


 


Pulse Ox  94   07/10/19 03:07








 Intake & Output











 07/09/19 07/10/19 07/10/19





 18:59 06:59 18:59


 


Intake Total 770 176 


 


Output Total 0  


 


Balance 770 176 


 


Weight  234 lb 8 oz 


 


Intake:   


 


  IVPB 50 56 


 


    ABX - CEFEPIME 50 56 


 


  Oral 720 120 


 


Output:   


 


  Urine 0  


 


Other:   


 


  Estimated Void  Medium 


 


  # Voids  1 








Gen:awake, no distress


HEENT:no thrush


Heart:RRR no murmur


Lungs:CTA BL


Abd:+BS NTND soft


Skin: left lower leg with mild erythema; collapsed bullae; tender to palpation 

no fluctuance


 Laboratory Results - last 24 hr











  07/09/19 07/09/19 07/09/19





  11:22 16:40 17:44


 


WBC   


 


RBC   


 


Hgb   


 


Hct   


 


MCV   


 


MCH   


 


MCHC   


 


RDW   


 


Plt Count   


 


MPV   


 


Neut % (Auto)   


 


Lymph % (Auto)   


 


Mono % (Auto)   


 


Eos % (Auto)   


 


Baso % (Auto)   


 


Absolute Neuts (auto)   


 


Absolute Lymphs (auto)   


 


Absolute Monos (auto)   


 


Absolute Eos (auto)   


 


Absolute Basos (auto)   


 


Absolute Nucleated RBC   


 


Nucleated RBC %   


 


POC Glucose (mg/dL)  222 H  74  114 H














  07/09/19 07/10/19 07/10/19





  20:43 05:31 07:16


 


WBC   10.7 


 


RBC   3.48 L 


 


Hgb   10.3 L 


 


Hct   30 L 


 


MCV   87 


 


MCH   30 


 


MCHC   34 


 


RDW   14 


 


Plt Count   263 


 


MPV   9.7 


 


Neut % (Auto)   67.0 


 


Lymph % (Auto)   20.7 


 


Mono % (Auto)   7.2 


 


Eos % (Auto)   4.3 


 


Baso % (Auto)   0.8 


 


Absolute Neuts (auto)   7.2 


 


Absolute Lymphs (auto)   2.2 


 


Absolute Monos (auto)   0.8 


 


Absolute Eos (auto)   0.5 


 


Absolute Basos (auto)   0.1 


 


Absolute Nucleated RBC   0.0 


 


Nucleated RBC %   0.1 


 


POC Glucose (mg/dL)  229 H   144 H














Assessment:


1. Left leg cellulitis, impproving


2. left lower leg thrombosis


3. BL lower extremity lymphedema


4. Insulin dependent diabetes mellitus











Plan:


1. will change clindamycin to 300 mg by mouth three times daily for 7 more days

, keep legs clean with soap and water, continued leg elevation, fu with me 1-2 

weeks.  


35 minutes floor time >50% face to face in discussion of monitoring and care of 

her legs

## 2019-07-11 ENCOUNTER — HOSPITAL ENCOUNTER (OUTPATIENT)
Dept: HOSPITAL 25 - ED | Age: 69
Setting detail: OBSERVATION
LOS: 2 days | Discharge: HOME | End: 2019-07-13
Attending: HOSPITALIST | Admitting: HOSPITALIST
Payer: COMMERCIAL

## 2019-07-11 DIAGNOSIS — R60.0: ICD-10-CM

## 2019-07-11 DIAGNOSIS — E66.9: ICD-10-CM

## 2019-07-11 DIAGNOSIS — L03.116: ICD-10-CM

## 2019-07-11 DIAGNOSIS — Z79.899: ICD-10-CM

## 2019-07-11 DIAGNOSIS — E11.9: ICD-10-CM

## 2019-07-11 DIAGNOSIS — I10: ICD-10-CM

## 2019-07-11 DIAGNOSIS — K58.9: ICD-10-CM

## 2019-07-11 DIAGNOSIS — M79.81: Primary | ICD-10-CM

## 2019-07-11 DIAGNOSIS — E07.9: ICD-10-CM

## 2019-07-11 DIAGNOSIS — I82.402: ICD-10-CM

## 2019-07-11 DIAGNOSIS — R10.9: ICD-10-CM

## 2019-07-11 DIAGNOSIS — Z79.4: ICD-10-CM

## 2019-07-11 DIAGNOSIS — K22.70: ICD-10-CM

## 2019-07-11 DIAGNOSIS — Z79.01: ICD-10-CM

## 2019-07-11 DIAGNOSIS — I73.9: ICD-10-CM

## 2019-07-11 LAB
ALBUMIN SERPL BCG-MCNC: 3.6 G/DL (ref 3.2–5.2)
ALBUMIN/GLOB SERPL: 1.1 {RATIO} (ref 1–3)
ALP SERPL-CCNC: 67 U/L (ref 34–104)
ALT SERPL W P-5'-P-CCNC: 81 U/L (ref 7–52)
ANION GAP SERPL CALC-SCNC: 8 MMOL/L (ref 2–11)
AST SERPL-CCNC: 51 U/L (ref 13–39)
BASOPHILS # BLD AUTO: 0.1 10^3/UL (ref 0–0.2)
BUN SERPL-MCNC: 12 MG/DL (ref 6–24)
BUN/CREAT SERPL: 12.1 (ref 8–20)
CALCIUM SERPL-MCNC: 9.2 MG/DL (ref 8.6–10.3)
CHLORIDE SERPL-SCNC: 105 MMOL/L (ref 101–111)
EOSINOPHIL # BLD AUTO: 0.2 10^3/UL (ref 0–0.6)
GLOBULIN SER CALC-MCNC: 3.2 G/DL (ref 2–4)
GLUCOSE SERPL-MCNC: 104 MG/DL (ref 70–100)
HCO3 SERPL-SCNC: 28 MMOL/L (ref 22–32)
HCT VFR BLD AUTO: 34 % (ref 35–47)
HGB BLD-MCNC: 11.3 G/DL (ref 12–16)
LYMPHOCYTES # BLD AUTO: 2.1 10^3/UL (ref 1–4.8)
MCH RBC QN AUTO: 29 PG (ref 27–31)
MCHC RBC AUTO-ENTMCNC: 34 G/DL (ref 31–36)
MCV RBC AUTO: 87 FL (ref 80–97)
MONOCYTES # BLD AUTO: 0.7 10^3/UL (ref 0–0.8)
NEUTROPHILS # BLD AUTO: 7.7 10^3/UL (ref 1.5–7.7)
NRBC # BLD AUTO: 0 10^3/UL
NRBC BLD QL AUTO: 0
PLATELET # BLD AUTO: 345 10^3/UL (ref 150–450)
POTASSIUM SERPL-SCNC: 3.8 MMOL/L (ref 3.5–5)
PROT SERPL-MCNC: 6.8 G/DL (ref 6.4–8.9)
RBC # BLD AUTO: 3.84 10^6 /UL (ref 3.7–4.87)
SODIUM SERPL-SCNC: 141 MMOL/L (ref 135–145)
WBC # BLD AUTO: 10.9 10^3/UL (ref 3.5–10.8)

## 2019-07-11 PROCEDURE — 96374 THER/PROPH/DIAG INJ IV PUSH: CPT

## 2019-07-11 PROCEDURE — 96375 TX/PRO/DX INJ NEW DRUG ADDON: CPT

## 2019-07-11 PROCEDURE — 99284 EMERGENCY DEPT VISIT MOD MDM: CPT

## 2019-07-11 PROCEDURE — 74177 CT ABD & PELVIS W/CONTRAST: CPT

## 2019-07-11 PROCEDURE — 83605 ASSAY OF LACTIC ACID: CPT

## 2019-07-11 PROCEDURE — 85025 COMPLETE CBC W/AUTO DIFF WBC: CPT

## 2019-07-11 PROCEDURE — 80053 COMPREHEN METABOLIC PANEL: CPT

## 2019-07-11 PROCEDURE — 96376 TX/PRO/DX INJ SAME DRUG ADON: CPT

## 2019-07-11 PROCEDURE — 36415 COLL VENOUS BLD VENIPUNCTURE: CPT

## 2019-07-11 PROCEDURE — 80048 BASIC METABOLIC PNL TOTAL CA: CPT

## 2019-07-11 PROCEDURE — 83690 ASSAY OF LIPASE: CPT

## 2019-07-11 PROCEDURE — 87040 BLOOD CULTURE FOR BACTERIA: CPT

## 2019-07-11 PROCEDURE — 86140 C-REACTIVE PROTEIN: CPT

## 2019-07-11 PROCEDURE — G0378 HOSPITAL OBSERVATION PER HR: HCPCS

## 2019-07-11 NOTE — ED
Progress





- Progress Note


Progress Note: 





Pt is a 67 y/o F signed out from Dr. Matta pending CT a/p results. The pt was 

recently admitted and d/maryanne with LE cellulitis and DVT. She was placed on 

Clindamycin 300mg TID. She reports abd pain in the R flank that radiates down 

to the umbilicus. Dr. Matta ordered a CT a/p to evaluate what the pain might be 

from, and we are since waiting for results. It could be failed outpatient tx of 

the cellulitis.





- Results/Orders


Results/Orders: 





CT a/p shows:





1. Small inferior right rectus abdominis intramuscular hematoma. A neoplasm is 

considered much less likely. 


2. Bosniak type I renal cyst. No followup indicated. 


3. Right greater than left pleural effusions and associated lower lobe volume 

loss. 


4. Small hiatal hernia.





ED physician has reviewed this report.





Re-Evaluation





- Re-Evaluation


  ** 2045


Re-Evaluation Time: 20:45


Change: Unchanged


Comment: 2045  I spoke with the patient about her current condition. She 

states the abd pain starts in her R flank, but is worst in her umbilicus and 

groin. The pain started in the last couple of days during her recent hospital 

visit, which flared up a couple of hours after she returned home. She reports 

being restless and weak, but also states that the cellulitis in her legs has 

worsened. She says Dr. Hdez took her from 2 IV drugs to just simply IV 

Clindamycin, then to PO Clindamycin just prior to d/c.





Course/Dx





- Course


Course Of Treatment: Pt is a 67 y/o F signed out from Dr. Matta pending CT a/p 

results. The pt was recently admitted and d/maryanne with LE cellulitis and DVT. 

She was placed on Clindamycin 300mg TID. She has R flank pain that radiates 

down to the umbilicus. Dr. Matta ordered a CT a/p to evaluate what the pain 

might be from, and we are since waiting for results. It could be failed 

outpatient tx of her LE.  CT a/p shows:  1. Small inferior right rectus 

abdominis intramuscular hematoma. A neoplasm is considered much less likely.  

2. Bosniak type I renal cyst. No followup indicated.  3. Right greater than 

left pleural effusions and associated lower lobe volume loss.  4. Small hiatal 

hernia.  2045  I spoke with the patient about her current condition. She 

states the abd pain starts in her R flank, but is worst in her umbilicus and 

groin. The pain started in the last couple of days during her recent hospital 

visit, which flared up a couple of hours after she returned home. She reports 

being restless and weak, but also states that the cellulitis in her legs has 

worsened. She says Dr. Hdez took her from 2 IV drugs to just simply IV 

Clindamycin, then to PO Clindamycin just prior to d/c.  I'd like to speak to 

the hospitalist to discuss admission d/t worsening cellulitis and her R sided 

flank pain.  I spoke with Dr. Fernandez at 21:24 about the pt's present condition. 

He will be accepting her to [facility] with dx including cellulitis, and abd wall 

hematoma.





- Diagnoses


Provider Diagnoses: 


 Cellulitis, Abdominal wall hematoma








- Provider Notifications


Discussed Care Of Patient With: Shravan Fernandez


Time Discussed With Above Provider: 21:24


Instructed by Provider To: Admit As Inpatient





Discharge





- Sign-Out/Discharge


Documenting (check all that apply): Patient Departure, Receiving Sign-Out


Receiving patient FROM: Jordan Matta





- Discharge Plan


Condition: Stable


Disposition: ADMITTED TO [facility]





- Billing Disposition and Condition


Condition: STABLE


Disposition: Admitted to [facility]





- Attestation Statements


Document Initiated by Leelee: Yes


Documenting Scribe: Juana Jovel


Provider For Whom Leelee is Documenting (Include Credential): Pantera Soto MD.


Scribe Attestation: 


I, Juana Jovel, amarjited for Pantera Soto MD. on 07/12/19 at 0619. 


Scribe Documentation Reviewed: Yes


Provider Attestation: 


The documentation as recorded by the Juana jovel accurately 

reflects the service I personally performed and the decisions made by me, Pantera Soto MD.


Status of Scribe Document: Viewed





Consult


Consult: 





2124 - Dr. Fernandez accepts the pt to [facility].

## 2019-07-12 LAB
ANION GAP SERPL CALC-SCNC: 8 MMOL/L (ref 2–11)
BASOPHILS # BLD AUTO: 0.1 10^3/UL (ref 0–0.2)
BUN SERPL-MCNC: 11 MG/DL (ref 6–24)
BUN/CREAT SERPL: 11.6 (ref 8–20)
CALCIUM SERPL-MCNC: 9 MG/DL (ref 8.6–10.3)
CHLORIDE SERPL-SCNC: 106 MMOL/L (ref 101–111)
EOSINOPHIL # BLD AUTO: 0.2 10^3/UL (ref 0–0.6)
GLUCOSE SERPL-MCNC: 150 MG/DL (ref 70–100)
HCO3 SERPL-SCNC: 25 MMOL/L (ref 22–32)
HCT VFR BLD AUTO: 32 % (ref 35–47)
HGB BLD-MCNC: 11 G/DL (ref 12–16)
LYMPHOCYTES # BLD AUTO: 1.8 10^3/UL (ref 1–4.8)
MCH RBC QN AUTO: 30 PG (ref 27–31)
MCHC RBC AUTO-ENTMCNC: 35 G/DL (ref 31–36)
MCV RBC AUTO: 87 FL (ref 80–97)
MONOCYTES # BLD AUTO: 0.7 10^3/UL (ref 0–0.8)
NEUTROPHILS # BLD AUTO: 7.3 10^3/UL (ref 1.5–7.7)
NRBC # BLD AUTO: 0 10^3/UL
NRBC BLD QL AUTO: 0.1
PLATELET # BLD AUTO: 358 10^3/UL (ref 150–450)
POTASSIUM SERPL-SCNC: 3.8 MMOL/L (ref 3.5–5)
RBC # BLD AUTO: 3.67 10^6 /UL (ref 3.7–4.87)
SODIUM SERPL-SCNC: 139 MMOL/L (ref 135–145)
WBC # BLD AUTO: 10.2 10^3/UL (ref 3.5–10.8)

## 2019-07-12 RX ADMIN — APIXABAN SCH MG: 5 TABLET, FILM COATED ORAL at 21:49

## 2019-07-12 RX ADMIN — INSULIN DETEMIR SCH UNIT: 100 INJECTION, SOLUTION SUBCUTANEOUS at 21:54

## 2019-07-12 RX ADMIN — ACETAMINOPHEN PRN MG: 325 TABLET ORAL at 22:00

## 2019-07-12 RX ADMIN — METOPROLOL SUCCINATE SCH MG: 25 TABLET, EXTENDED RELEASE ORAL at 10:39

## 2019-07-12 RX ADMIN — CLINDAMYCIN HYDROCHLORIDE SCH MG: 150 CAPSULE ORAL at 21:48

## 2019-07-12 RX ADMIN — INSULIN ASPART SCH UNITS: 100 INJECTION, SOLUTION INTRAVENOUS; SUBCUTANEOUS at 17:31

## 2019-07-12 RX ADMIN — APIXABAN SCH MG: 5 TABLET, FILM COATED ORAL at 10:39

## 2019-07-12 RX ADMIN — THERA TABS SCH: TAB at 10:37

## 2019-07-12 RX ADMIN — ACETAMINOPHEN PRN MG: 325 TABLET ORAL at 14:07

## 2019-07-12 RX ADMIN — CLINDAMYCIN HYDROCHLORIDE SCH MG: 150 CAPSULE ORAL at 16:03

## 2019-07-12 RX ADMIN — Medication SCH MG: at 09:24

## 2019-07-12 RX ADMIN — INSULIN ASPART SCH UNITS: 100 INJECTION, SOLUTION INTRAVENOUS; SUBCUTANEOUS at 12:29

## 2019-07-12 RX ADMIN — SPIRONOLACTONE SCH MG: 25 TABLET ORAL at 11:53

## 2019-07-12 NOTE — CONS
CONSULTATION REPORT:

 

DATE OF CONSULT:  07/12/19

 

REQUESTING PHYSICIAN:  Dr. English.

 

CONSULTING SERVICE:  Infectious disease.

 

REASON FOR CONSULT:  Left leg cellulitis.

 

IMPRESSION:

1.  Left lower extremity cellulitis, likely group A strep.  She had been on a 
few days of IV antibiotics and transitioned to oral clindamycin, was home for a 
little while, but came back because of right lower quadrant pain and was found 
to have a rectus sheath hematoma.  She was switched back to IV antibiotics 
because in the ER she was felt to have worsening erythema.  She and I had 
discussed before that I expected the erythema and edema to worsen when she is 
home and up on her feet more, which she had been.  Her leg looks to be 
continuing to improve currently and I do not think there is an issue with the 
clindamycin therapy.

2.  Obesity.

3.  Diabetes.

 

RECOMMENDATIONS:  We will continue clindamycin 300 mg by mouth 3 times a day. 
Focus on keeping her leg elevated.  Okay for her to be up and around when she 
is not keeping the legs elevated.

 

HISTORY OF PRESENT ILLNESS:  This is a 68-year-old woman who had been in the 
hospital with left leg cellulitis, had been showing significant improvement, 
was discharged on clindamycin.  She came back to the hospital couple of hours 
after leaving because of right lower quadrant pain.  A CT scan done in the ER 
showed a rectus hematoma.  She had been up and around a bit for couple hours at 
home and then was sitting up in the ER for few hours and in the ER, they felt 
her leg was more red.  She was switched to IV antibiotics and restarted on 
bedrest.  She had a temp of 38 degrees yesterday in the ER.

 

PAST MEDICAL HISTORY:

1.  Irritable bowel syndrome.

2.  Hypertension.

3.  Gastroesophageal reflux disease.

4.  Drummond esophagus.

5.  Lymphedema.

6.  Type 2 diabetes.

7.  Left lower extremity thrombosis.

 

ALLERGIES:

1.  IBUPROFEN.

2.  METFORMIN.

3.  NONSTEROIDALS.

4.  NARCOTICS.

 

MEDICATIONS:

1.  Tylenol.

2.  Eliquis.

3.  Cefepime 1 g every 12 hours.

4.  Dexlansoprazole.

5.  Metoprolol.

6.  Multivitamin.

7.  Spironolactone.

8.  Vancomycin 1 g every 12 hours.

 

SOCIAL HISTORY:  She lives in [place] with her .  She is a nonsmoker.

 

FAMILY HISTORY:  No recurrent infections.

 

REVIEW OF SYSTEMS:  All negative, except as noted above to a 12-point review.

 

PHYSICAL EXAM:  Vital Signs:  Temperature 36.4, heart rate 78, respiratory rate 
16, blood pressure 154/73, oxygen saturation 96% on room air.  In general, she 
is awake, not in distress.  Neurologic:  She is oriented x3, follows all 
commands. HEENT:  There is no conjunctival hemorrhage.  Oropharynx without 
lesions.  Neck is supple without mass.  Heart is regular rate and rhythm 
without murmurs, rubs, or gallops.  Lungs are clear to auscultation 
bilaterally.  Abdomen:  Soft, nontender, nondistended.  There are bowel sounds 
present.  Skin:  There is no rash or splinter hemorrhage.  Musculoskeletal:  
There is bilateral lower extremity nonpitting edema and on the left lower 
extremity, there is barely visible erythema around some evolving eschar on the 
anterior and posterior lower leg with some serous drainage from resulting 
bullae.  There is no tenderness to palpation in the lower leg.

 

LABORATORY DATA:  White blood cell count 10, hemoglobin 11, platelets 358. 
Creatinine 0.9.  CRP was 35 yesterday.  ALT was 81. Please see impression and 
recommendations outlined above, which I have discussed with Dr. English.

 

Thanks for asking me to see MsKonstantin Grazyna in consultation.

 

 [id number] #: [id number]

ANJELICA

## 2019-07-12 NOTE — PN
Subjective


Interval History: 








RLQ pain worse with certain movements (like abdominal crunch) but overall much 

improved. 


took brief walk


HA 8/10. has lasted 7 days. not worse of her life. Took some tylenol

















Objective


Active Medications: 








Acetaminophen (Tylenol Tab*)  650 mg PO Q4H PRN


   PRN Reason: FEVER/PAIN


   Last Admin: 07/12/19 14:07 Dose:  650 mg


Apixaban (Eliquis*)  5 mg PO BID [facility]


   Last Admin: 07/12/19 10:39 Dose:  5 mg


Clindamycin HCl (Cleocin Cap*)  300 mg PO TID [facility]


   Last Admin: 07/12/19 16:03 Dose:  300 mg


Insulin Aspart (Novolog (Nf))  0 units SUBCUT AC [facility]


   Last Admin: 07/12/19 12:29 Dose:  8 units


Metoprolol Succinate (Toprol Xl Tab*)  25 mg PO DAILY [facility]


   Last Admin: 07/12/19 10:39 Dose:  25 mg


Multivitamins/Minerals (Theragran/Minerals Tab*)  1 tab PO DAILY [facility]


   Last Admin: 07/12/19 10:37 Dose:  Not Given


Pto: ( Dexlansoprazole [ Dexilant] 30 Mg)  30 mg PO DAILY [facility]


   Last Admin: 07/12/19 09:24 Dose:  30 mg


Spironolactone (Aldactone Tab*)  25 mg PO DAILY [facility]


   Last Admin: 07/12/19 11:53 Dose:  25 mg


Valsartan (Diovan Tab*)  40 mg PO BEDTIME [facility]








 Vital Signs - 8 hr











  07/12/19 07/12/19





  11:50 15:43


 


Temperature 96.8 F 98 F


 


Pulse Rate 72 70


 


Respiratory 16 16





Rate  


 


Blood Pressure 159/55 142/54





(mmHg)  


 


O2 Sat by Pulse 98 96





Oximetry  











Oxygen Devices in Use Now: None


Appearance: NAD


Eyes: No Scleral Icterus


Ears/Nose/Mouth/Throat: NL Teeth, Lips, Gums


Neck: NL Appearance and Movements; NL JVP


Respiratory: Symmetrical Chest Expansion and Respiratory Effort, Clear to 

Auscultation


Abdominal: NL Sounds; No Tenderness; No Distention, No Hepatosplenomegaly


Extremities: No Edema


Skin: - - erythema, bullae most prominent on left anterior and posterior calf (

later with some sloughing). right leg scant. 


Neurological: Alert and Oriented x 3


Nutrition: Taking PO's


Result Diagrams: 


 07/12/19 08:14





 07/12/19 08:14


Additional Lab and Data: 


 Laboratory Results - last 24 hr











  07/11/19 07/12/19 07/12/19





  17:55 08:14 08:14


 


WBC   10.2 


 


RBC   3.67 L 


 


Hgb   11.0 L 


 


Hct   32 L 


 


MCV   87 


 


MCH   30 


 


MCHC   35 


 


RDW   14 


 


Plt Count   358 


 


MPV   8.5 


 


Neut % (Auto)   72.1 


 


Lymph % (Auto)   17.8 


 


Mono % (Auto)   7.0 


 


Eos % (Auto)   2.4 


 


Baso % (Auto)   0.7 


 


Absolute Neuts (auto)   7.3 


 


Absolute Lymphs (auto)   1.8 


 


Absolute Monos (auto)   0.7 


 


Absolute Eos (auto)   0.2 


 


Absolute Basos (auto)   0.1 


 


Absolute Nucleated RBC   0.0 


 


Nucleated RBC %   0.1 


 


Sodium    139


 


Potassium    3.8


 


Chloride    106


 


Carbon Dioxide    25


 


Anion Gap    8


 


BUN    11


 


Creatinine    0.95


 


Est GFR ( Amer)    70.8


 


Est GFR (Non-Af Amer)    58.5


 


BUN/Creatinine Ratio    11.6


 


Glucose    150 H


 


POC Glucose (mg/dL)   


 


Lactic Acid  1.1  


 


Calcium    9.0














  07/12/19 07/12/19 07/12/19





  08:35 11:58 16:42


 


WBC   


 


RBC   


 


Hgb   


 


Hct   


 


MCV   


 


MCH   


 


MCHC   


 


RDW   


 


Plt Count   


 


MPV   


 


Neut % (Auto)   


 


Lymph % (Auto)   


 


Mono % (Auto)   


 


Eos % (Auto)   


 


Baso % (Auto)   


 


Absolute Neuts (auto)   


 


Absolute Lymphs (auto)   


 


Absolute Monos (auto)   


 


Absolute Eos (auto)   


 


Absolute Basos (auto)   


 


Absolute Nucleated RBC   


 


Nucleated RBC %   


 


Sodium   


 


Potassium   


 


Chloride   


 


Carbon Dioxide   


 


Anion Gap   


 


BUN   


 


Creatinine   


 


Est GFR ( Amer)   


 


Est GFR (Non-Af Amer)   


 


BUN/Creatinine Ratio   


 


Glucose   


 


POC Glucose (mg/dL)  154 H  150 H  173 H


 


Lactic Acid   


 


Calcium   











Microbiology and Other Data: 


 


 


 


  Microbiology





07/11/19 14:20   Blood Venous   Blood Culture - Preliminary


                            No Growth Day 1











Assess/Plan/Problems-Billing


Assessment: 





69 yo female PMH IDDM, HTN, GERD, Drummond's Esophagus with recent nonoclusive 

left DVT (on eliquis) and left >right lower extremity cellulitis suspected 2/2 

strep returns less than 24 hours after d/c with RLQ pain and continued bullae. 

right rectus sheath hematoma. ID on board, recommend return to po clinda 300mg 

TID. 





- Patient Problems


(1) Cellulitis


Current Visit: No   Status: Acute   Code(s): L03.90 - CELLULITIS, UNSPECIFIED   

SNOMED Code(s): 242648565


   Comment: - there was some concern that the rash was worse on the po meds but 

this can also be the natural history of a strep cellulitis. Patient tells me 

that Dr. Hdez would check again tomorrow to see if needs the IV 

anitibiotics again.  


- appreciate ID. Abx to be changed back again to po  Clindamycin 300mg TID


- Blood cultures, NGTD


- suspected from strep   





(2) DVT prophylaxis


Current Visit: No   Status: Acute   Code(s): Z29.9 - ENCOUNTER FOR PROPHYLACTIC 

MEASURES, UNSPECIFIED   SNOMED Code(s): 299331213


   Comment: 


 - Continue eliquis as per above.   





(3) Deep vein thrombosis (DVT) of left lower extremity


Current Visit: No   Status: Acute   Code(s): I82.402 - ACUTE EMBOLISM AND 

THOMBOS UNSP DEEP VEINS OF L LOW EXTREM   SNOMED Code(s): 197074660


   Comment: 


 - LLE superficial femoral vein


 - eliquis  5 mg BID   





(4) Diabetes


Current Visit: No   Status: Acute   Code(s): E11.9 - TYPE 2 DIABETES MELLITUS 

WITHOUT COMPLICATIONS   SNOMED Code(s): 74898164


   Comment: 150-170s


- home sliding scale of lispro 8+ SSI


- restart levimir tonight at 15U BID (from 20U BID)


- POC [facility] hs   





(5) Full code status


Current Visit: No   Status: Acute   Code(s): Z78.9 - OTHER SPECIFIED HEALTH 

STATUS   SNOMED Code(s): 335431526


   Comment: 


   





(6) Hypertension


Current Visit: No   Status: Acute   Code(s): I10 - ESSENTIAL (PRIMARY) 

HYPERTENSION   SNOMED Code(s): 73385133


   Comment: 


 - BP well controlled 140-150s, 170 last night


 - Continue toprol 25mg daily home dose and valsartan 40mg, restart aldactone 

25mg   


Status and Disposition: 





medicine observation status.

## 2019-07-12 NOTE — HP
CC:  Dr. Jolly *

 

HISTORY AND PHYSICAL:

 

DATE OF ADMISSION:  07/11/19

 

PROVIDER:  Carli Wahl NP

 

PRIMARY CARE PROVIDER:  Dr. Jolly.

 

ATTENDING PHYSICIAN WHILE IN THE HOSPITAL:  Dr. Shravan Fernandez * (dictated by 
Carli Wahl NP).

 

CHIEF COMPLAINT:

1.  Abdominal pain.

2.  Cellulitis.

 

HISTORY OF PRESENT ILLNESS:  Ms. Geronimo is a 68-year-old female with past 
medical history significant for hypertension, GERD, Drummond's esophagus, edema, 
diabetes, and recent admission from 07/03/19 to 07/10/19 for cellulitis and 
left leg DVT and sepsis, who presented to the emergency room with complaints of 
abdominal pain. During that hospitalization, the patient was placed on cefepime 
and vancomycin with improvement of her leg cellulitis.  She was seen and 
consulted by Infectious Disease.  Infectious Disease had recommended at 
discharge, clindamycin 300 mg 3 times a day for 7 more days.  Keep leg clean 
and dry and wash with soap and water. Keep leg elevated.

 

The patient reports that she returned home approximately 5 p.m. yesterday 
evening. She reports that she was feeling well and was doing fine.  She reports 
that she took a shower and was getting up into her bed when she developed 
severe lower abdominal pain.  She was able to lay down but continued to have 
abdominal pain overnight.  When she woke this morning, she still continued to 
have the abdominal pain, so she presented to the emergency room for further 
evaluation.  The patient reports that during that time her leg was improving 
and the redness was also improving and was not having any pain in that leg.

 

While in the emergency room, the patient reports that she developed increasing 
pain and redness in the left lower leg and continued to have abdominal pain.  
She had a CT of the abdomen and pelvis that showed a small hematoma.  When 
questioned about her abdominal pain, the patient reports that she has had the 
abdominal pain since last Thursday and it was just mild pain in the right lower 
abdomen that got acutely worse last evening.  Due to her abdominal pain and 
worsening cellulitis, we were asked to see and evaluate her for admission.

 

PAST MEDICAL HISTORY:  Significant for spastic colon, hypertension, GERD, 
Drummond's esophagus, edema, type 2 diabetes, cellulitis with recent admission 07
/03/19 to 07/10/19, DVT found on 07/03/19.

 

PAST SURGICAL HISTORY:

1.  Hysterectomy.

2.  Breast reduction.

3.  Spinal fusion.

 

MEDICATIONS:  Home medications include:

1.  Aldactone 25 mg p.o. daily.

2.  Toprol 12.5 mg p.o. daily.

3.  Dexilant 30 mg p.o. daily.

4.  Diovan 40 mg p.o. daily.

5.  NovoLog sliding scale.

6.  Levemir 16 units in the a.m. and 20 units in the p.m.

7.  Eliquis 5 mg b.i.d.

8.  Clindamycin 300 mg t.i.d.

 

ALLERGIES:  IBUPROFEN, METFORMIN, and various ANTIBIOTICS, NSAIDS.

 

FAMILY HISTORY:  Father with a quadruple bypass at the age of 85. No reported 
history of diabetes.  Mother with basal cell carcinoma of the skin.  Brother 
with fibrosarcoma.

 

SOCIAL HISTORY:  The patient denies any tobacco, alcohol, or illicit drug use.  
She is .  She lives with her .  She usually walks with a cane or 
walker for ambulation at home.  Surrogate decision maker in the event she is 
unable to make her own decisions is her .  She is a full code.

 

REVIEW OF SYSTEMS:  The patient denies any fever or chills.  Denies chest pain 
or edema.  No cough, hemoptysis or shortness of breath.  She denies any nausea 
or vomiting or diarrhea.  She does report right lower quadrant abdominal pain.  
Denies any hematuria, dysuria, focal weakness or sensory loss.  Denies any 
visual complaints, dysphagia, arthralgias, myalgias.  She does complain of rash 
to the right lower leg with increasing redness and increasing pain.  Denies any 
psychosis or anxiety.

 

                               PHYSICAL EXAMINATION

 

GENERAL:  At this time, Ms. Geronimo is resting comfortably on the stretcher in 
the emergency room.  She is in no acute distress.

 

VITAL SIGNS:  Temperature was 100.4, heart rate 74, respirations 18, O2 
saturation 96%, blood pressure 173/80.

 

HEENT:  Head is atraumatic, normocephalic.  Eyes:  EOMs are intact.  Sclerae 
anicteric and not pale.  Oral mucosa appeared to be moist.

 

NECK:  Supple.

 

LUNGS:  Clear to auscultation bilaterally.  No wheezes, rales, or rhonchi.

 

CARDIAC:  S1, S2.  Regular rate and rhythm.  No murmurs, rubs, or gallops.

 

ABDOMEN:  Obese, soft.  She does have some tenderness noted to right lower 
quadrant.  There is a small ecchymotic area noted to the skin.

 

EXTREMITIES:  She is able to move all 4 extremities.  Pedal pulses are +2 
bilaterally.  She does have erythema and swelling noted to the left lower leg 
with fluid filled blister noted to the shin.  Right lower leg with healing 
scabbed areas.

 

NEUROLOGIC:  She is awake, alert, oriented x3.  Speech is clear.  Thought 
process is intact.  There are no gross focal deficits.

 

DIAGNOSTIC STUDIES/LAB DATA:  WBCs are 10.9, RBCs 3.84, hemoglobin 11.3, 
hematocrit 34, platelet count 345.  Sodium 141, potassium 3.8, chloride 105, 
carbon dioxide 20, anion gap was 8, BUN 12, creatinine 0.99, glucose 104.  
Lactic acid 0.9 and 1.1.  Calcium 9.2.  ASTs were 51, ALTs were 81. C-reactive 
protein 35.51. Alkaline phosphatase 67.  Lipase was less than 10.

 

The patient had a CT of the abdomen and pelvis, radiologist impression:  Small 
inferior right rectus abdominis intramuscular hematoma.  A neoplasm is 
considered but less likely.  Bosniak type 1 renal cyst, no followup indicated.  
Right greater than left pleural effusions with associated low volume loss, 
small hiatal hernia.

 

ASSESSMENT AND PLAN:  Ms. Geronimo is a 68-year-old female with past medical 
history significant for hypertension, gastroesophageal reflux disease, Drummond'
s esophagus, lower extremity edema, type 2 diabetes, recent diagnosis of deep 
venous thrombosis and cellulitis, who presented to the emergency room with 
complaints of abdominal pain and increasing redness to the left lower leg.  She 
will be admitted inpatient for:

1.  Cellulitis.  The patient was recently discharged yesterday on clindamycin 
300 mg p.o. t.i.d.  The patient did have a low-grade fever in the emergency 
room this evening, during her prior stay had no fevers.  She did have a T-max 
of 100.4. Given this, I will stop her clindamycin and place her back on 
cefepime and vanco. I would recommend consultation to Infectious Disease.  The 
patient does report increased pain, swelling, and redness to the left lower 
leg.  She does report that her pain and swelling had subsided during her last 
admission.

2.  Recent left leg DVT.  The patient does have a DVT in the left leg.  She was 
started on Eliquis on 07/03/19.  She should continue her Eliquis at this time 
at 5 mg p.o. b.i.d. as the patient does report she has had abdominal pain since 
last Thursday and had a CT of the abdomen that showed a small hematoma in the 
rectus muscle.

3.  Type 2 diabetes.  I will place the patient on fingersticks a.c. and h.s.  
She would like to continue on her home insulin of Levemir and NovoLog.  I will 
order her NovoLog at 8 units with meals.  We are going to hold on her Levemir 
at this time as the patient's blood sugar is 104.

4.  Hypertension.  She will continue on her metoprolol as previously prescribed.

5.  Edema.  The patient does have chronic lower extremity edema.  Given the 
patient's continued cellulitis and infection, I am going to hold her torsemide 
and Aldactone at this time.

6.  Acid reflux.  The patient should continue on Dexilant as previously 
prescribed.

7.  FEN.  She can have a consistent carb diet.

8.  Code status.  She is a full code.

9.  DVT prophylaxis.  She will continue on Eliquis.  Mechanical DVT prophylaxis 
at this time is contraindicated as the patient does have a DVT in her left 
lower leg.

 

TIME SPENT:  Time spent on this admission was approximately 60 minutes, greater 
than half that time was spent at the bedside reviewing events leading thus far 
to her hospitalization, performing physical exam, and reviewing my plan of care.

 

I have discussed this with my attending, Dr. Shravan Fernandez, he is in agreement 
with my plan.

 

 ____________________________________ CARLI WAHL, ALY

 

[id number]/CPS #: [id number]

ANJELICA

## 2019-07-13 VITALS — DIASTOLIC BLOOD PRESSURE: 60 MMHG | SYSTOLIC BLOOD PRESSURE: 141 MMHG

## 2019-07-13 RX ADMIN — INSULIN ASPART SCH UNITS: 100 INJECTION, SOLUTION INTRAVENOUS; SUBCUTANEOUS at 08:44

## 2019-07-13 RX ADMIN — METOPROLOL SUCCINATE SCH MG: 25 TABLET, EXTENDED RELEASE ORAL at 08:46

## 2019-07-13 RX ADMIN — APIXABAN SCH MG: 5 TABLET, FILM COATED ORAL at 08:47

## 2019-07-13 RX ADMIN — SPIRONOLACTONE SCH MG: 25 TABLET ORAL at 08:48

## 2019-07-13 RX ADMIN — THERA TABS SCH: TAB at 08:50

## 2019-07-13 RX ADMIN — INSULIN DETEMIR SCH UNIT: 100 INJECTION, SOLUTION SUBCUTANEOUS at 08:42

## 2019-07-13 RX ADMIN — INSULIN ASPART SCH UNITS: 100 INJECTION, SOLUTION INTRAVENOUS; SUBCUTANEOUS at 12:33

## 2019-07-13 RX ADMIN — INSULIN ASPART SCH: 100 INJECTION, SOLUTION INTRAVENOUS; SUBCUTANEOUS at 19:07

## 2019-07-13 RX ADMIN — ACETAMINOPHEN PRN MG: 325 TABLET ORAL at 13:45

## 2019-07-13 RX ADMIN — Medication SCH MG: at 08:47

## 2019-07-13 RX ADMIN — CLINDAMYCIN HYDROCHLORIDE SCH MG: 150 CAPSULE ORAL at 13:33

## 2019-07-13 RX ADMIN — CLINDAMYCIN HYDROCHLORIDE SCH MG: 150 CAPSULE ORAL at 08:46

## 2019-07-15 NOTE — DS
DISCHARGE SUMMARY:

 

DATE OF ADMISSION:  07/11/19

 

DATE OF DISCHARGE:  07/13/19

 

ADMITTING PROVIDER:  Carli Wahl NP

 

PRIMARY CARE PHYSICIAN:  Dr. Jolly.

 

CONSULTING INFECTIOUS DISEASE PHYSICIAN:  Dr. Moshe Hdez.

 

ATTENDING PHYSICIAN ON THE DAY OF DISCHARGE:  Ernst English MD

 

CHIEF COMPLAINT:  Abdominal pain and left lower leg cellulitis that seemed to 
possibly be worsening after discharge the previous day.

 

PRINCIPAL DIAGNOSIS:  Rectus sheath hematoma in the setting of new Eliquis for 
DVT treatment; left greater than right lower extremity cellulitis likely 
streptococcal.

 

HISTORY OF PRESENT ILLNESS AND HOSPITAL COURSE:  Oneida Geronimo is a 68-year-old 
female with a past medical history significant for hypertension, GERD, Drummond'
s esophagus, edema, insulin-dependent diabetes mellitus with a recent admission 
07/03/19 to 07/10/19 for suspected streptococcal lower extremity cellulitis, 
left worse than the right along with nonocclusive left leg DVT and sepsis.  She 
had gotten approximately 7 days of cefepime and transitioned to p.o. 
clindamycin on the day of discharge 300 mg t.i.d.  Please see H and P of 
Carli Wahl NP for full details, but briefly she developed severe right 
lower abdominal pain upon getting into bed that continued the next morning.  
She presented to [facility] Emergency Room, had a CT abdomen and pelvis which showed a 
small hematoma in the right rectus abdominis muscle.  The ED physician was also 
concerned that the left leg cellulitis looked worse and she was referred to the 
hospitalist service for admission.  She was already admitted to observation 
status.  She did have a temperature of 100.4 and leukocytosis of 10.9.  Her CRP 
was checked and 35.5.  She was started on vancomycin and cefepime and had blood 
cultures.  There have been no growth, just 3 days now. Her ______ was continued 
and her abdominal pain had resolved.  She was seen by Dr. Hdez in 
consultation who recommended switching back to the clindamycin (now she has 
only had 2 total doses).  She had been advised that the leg would likely feel 
and look worse in the setting of it being more dependent with ambulation and 
the nature of this streptococcal infection.  Hospital  day #3 she developed 
slight macular rash underneath her bilateral breasts that was slightly itchy 
and it resolved with Benadryl.  Her left leg was overall itchy as well.  She 
felt like the pain had crept up slightly her left posterior calf.  She lost IV 
access and was not able to be reestablished.  Being that it was over the weekend
, there was no ultrasound tech available.  I contacted Dr. Moshe Hdez who 
offered to see her in closer followup 2 days after discharge on Monday 07/15/19 
and it was decided that she was stable to continue oral clindamycin for her 
suspected streptococcal cellulitis.  She had not yet established with [facility] and 
this will be arranged for them to call her next Monday 07/15/19 two days after 
discharge.

 

DISCHARGE MEDICATIONS:  Include:

1.  Tylenol 650 mg p.o. q.4 hours p.r.n.

2.  Eliquis 5 mg p.o. b.i.d.

3.  Clindamycin 300 mg p.o. t.i.d.

4.  Dexilant 30 mg p.o. daily.

5.  Benadryl 25 mg p.o. q.6 hours p.r.n. (new).

6.  NovoLog sliding scale.

7.  Detemir 20 units b.i.d.

8.  ______ p.o. daily.

7.  Multivitamin 1 tab p.o. daily.

8.  Omega-3 fatty acid 1000 mg p.o. daily.

9.  Spironolactone 25 mg daily.

10.  Valsartan 40 mg daily.

 

FOLLOWUP:  Please follow up with Dr. Jolly within 7 to 10 days, Dr. Moshe Hdez within the next 3 to 5 days.  She is being discharged with visiting 
nurse service who should be calling her within the next 2 to 3 days.

 

DISCHARGE DIET:  Heart healthy, carbohydrate consistent, unchanged.

 

DISPOSITION:  Home.

 

CONDITION:  Stable.

 

TIME SEEN ON DISCHARGE:  35 minutes.

 

 [id number] #: [id number]

[email or web address]

## 2019-11-29 ENCOUNTER — HOSPITAL ENCOUNTER (EMERGENCY)
Dept: HOSPITAL 25 - UCEAST | Age: 69
Discharge: HOME HEALTH | End: 2019-11-29
Payer: MEDICARE

## 2019-11-29 ENCOUNTER — HOSPITAL ENCOUNTER (INPATIENT)
Dept: HOSPITAL 25 - ED | Age: 69
LOS: 6 days | Discharge: HOME | DRG: 871 | End: 2019-12-05
Attending: INTERNAL MEDICINE | Admitting: INTERNAL MEDICINE
Payer: MEDICARE

## 2019-11-29 VITALS — DIASTOLIC BLOOD PRESSURE: 45 MMHG | SYSTOLIC BLOOD PRESSURE: 113 MMHG

## 2019-11-29 DIAGNOSIS — A41.9: Primary | ICD-10-CM

## 2019-11-29 DIAGNOSIS — I89.0: ICD-10-CM

## 2019-11-29 DIAGNOSIS — K57.90: ICD-10-CM

## 2019-11-29 DIAGNOSIS — K21.9: ICD-10-CM

## 2019-11-29 DIAGNOSIS — Z88.8: ICD-10-CM

## 2019-11-29 DIAGNOSIS — E11.22: ICD-10-CM

## 2019-11-29 DIAGNOSIS — Z79.899: ICD-10-CM

## 2019-11-29 DIAGNOSIS — Z79.4: ICD-10-CM

## 2019-11-29 DIAGNOSIS — B95.62: ICD-10-CM

## 2019-11-29 DIAGNOSIS — F60.9: ICD-10-CM

## 2019-11-29 DIAGNOSIS — Z86.718: ICD-10-CM

## 2019-11-29 DIAGNOSIS — B37.9: ICD-10-CM

## 2019-11-29 DIAGNOSIS — N18.3: ICD-10-CM

## 2019-11-29 DIAGNOSIS — Z88.6: ICD-10-CM

## 2019-11-29 DIAGNOSIS — L03.116: ICD-10-CM

## 2019-11-29 DIAGNOSIS — K22.70: ICD-10-CM

## 2019-11-29 DIAGNOSIS — Z88.5: ICD-10-CM

## 2019-11-29 DIAGNOSIS — Z79.01: ICD-10-CM

## 2019-11-29 DIAGNOSIS — I50.33: ICD-10-CM

## 2019-11-29 DIAGNOSIS — E78.00: ICD-10-CM

## 2019-11-29 DIAGNOSIS — Z98.1: ICD-10-CM

## 2019-11-29 DIAGNOSIS — I10: ICD-10-CM

## 2019-11-29 DIAGNOSIS — M19.90: ICD-10-CM

## 2019-11-29 DIAGNOSIS — E66.9: ICD-10-CM

## 2019-11-29 DIAGNOSIS — Z88.1: ICD-10-CM

## 2019-11-29 DIAGNOSIS — E11.9: ICD-10-CM

## 2019-11-29 DIAGNOSIS — I13.0: ICD-10-CM

## 2019-11-29 LAB
ALBUMIN SERPL BCG-MCNC: 3.5 G/DL (ref 3.2–5.2)
ALBUMIN/GLOB SERPL: 1.3 {RATIO} (ref 1–3)
ALP SERPL-CCNC: 57 U/L (ref 34–104)
ALT SERPL W P-5'-P-CCNC: 17 U/L (ref 7–52)
ANION GAP SERPL CALC-SCNC: 9 MMOL/L (ref 2–11)
APTT PPP: 39.5 SECONDS (ref 26–38)
AST SERPL-CCNC: 25 U/L (ref 13–39)
BASOPHILS # BLD AUTO: 0.1 10^3/UL (ref 0–0.2)
BUN SERPL-MCNC: 24 MG/DL (ref 6–24)
BUN/CREAT SERPL: 15.6 (ref 8–20)
CALCIUM SERPL-MCNC: 8.8 MG/DL (ref 8.6–10.3)
CHLORIDE SERPL-SCNC: 103 MMOL/L (ref 101–111)
EOSINOPHIL # BLD AUTO: 0 10^3/UL (ref 0–0.6)
FLUAV RNA SPEC QL NAA+PROBE: NEGATIVE
FLUBV RNA SPEC QL NAA+PROBE: NEGATIVE
GLOBULIN SER CALC-MCNC: 2.8 G/DL (ref 2–4)
GLUCOSE SERPL-MCNC: 191 MG/DL (ref 70–100)
HCO3 SERPL-SCNC: 22 MMOL/L (ref 22–32)
HCT VFR BLD AUTO: 31 % (ref 35–47)
HGB BLD-MCNC: 10.7 G/DL (ref 12–16)
INR PPP/BLD: 2.4 (ref 0.82–1.09)
LYMPHOCYTES # BLD AUTO: 0.8 10^3/UL (ref 1–4.8)
MCH RBC QN AUTO: 29 PG (ref 27–31)
MCHC RBC AUTO-ENTMCNC: 35 G/DL (ref 31–36)
MCV RBC AUTO: 83 FL (ref 80–97)
MONOCYTES # BLD AUTO: 0.4 10^3/UL (ref 0–0.8)
NEUTROPHILS # BLD AUTO: 17.8 10^3/UL (ref 1.5–7.7)
NRBC # BLD AUTO: 0 10^3/UL
NRBC BLD QL AUTO: 0
PLATELET # BLD AUTO: 219 10^3/UL (ref 150–450)
POTASSIUM SERPL-SCNC: 3.8 MMOL/L (ref 3.5–5)
PROT SERPL-MCNC: 6.3 G/DL (ref 6.4–8.9)
RBC # BLD AUTO: 3.74 10^6 /UL (ref 3.7–4.87)
SODIUM SERPL-SCNC: 134 MMOL/L (ref 135–145)
TROPONIN I SERPL-MCNC: 0.03 NG/ML (ref ?–0.03)
TROPONIN I SERPL-MCNC: 0.04 NG/ML (ref ?–0.03)
WBC # BLD AUTO: 19.1 10^3/UL (ref 3.5–10.8)

## 2019-11-29 PROCEDURE — 80202 ASSAY OF VANCOMYCIN: CPT

## 2019-11-29 PROCEDURE — 87086 URINE CULTURE/COLONY COUNT: CPT

## 2019-11-29 PROCEDURE — 87040 BLOOD CULTURE FOR BACTERIA: CPT

## 2019-11-29 PROCEDURE — 85025 COMPLETE CBC W/AUTO DIFF WBC: CPT

## 2019-11-29 PROCEDURE — 80048 BASIC METABOLIC PNL TOTAL CA: CPT

## 2019-11-29 PROCEDURE — 81015 MICROSCOPIC EXAM OF URINE: CPT

## 2019-11-29 PROCEDURE — 71046 X-RAY EXAM CHEST 2 VIEWS: CPT

## 2019-11-29 PROCEDURE — 99212 OFFICE O/P EST SF 10 MIN: CPT

## 2019-11-29 PROCEDURE — 99284 EMERGENCY DEPT VISIT MOD MDM: CPT

## 2019-11-29 PROCEDURE — G0463 HOSPITAL OUTPT CLINIC VISIT: HCPCS

## 2019-11-29 PROCEDURE — 80053 COMPREHEN METABOLIC PANEL: CPT

## 2019-11-29 PROCEDURE — 83735 ASSAY OF MAGNESIUM: CPT

## 2019-11-29 PROCEDURE — 86140 C-REACTIVE PROTEIN: CPT

## 2019-11-29 PROCEDURE — 87641 MR-STAPH DNA AMP PROBE: CPT

## 2019-11-29 PROCEDURE — 85610 PROTHROMBIN TIME: CPT

## 2019-11-29 PROCEDURE — 85652 RBC SED RATE AUTOMATED: CPT

## 2019-11-29 PROCEDURE — 85730 THROMBOPLASTIN TIME PARTIAL: CPT

## 2019-11-29 PROCEDURE — 83880 ASSAY OF NATRIURETIC PEPTIDE: CPT

## 2019-11-29 PROCEDURE — 84484 ASSAY OF TROPONIN QUANT: CPT

## 2019-11-29 PROCEDURE — 83605 ASSAY OF LACTIC ACID: CPT

## 2019-11-29 PROCEDURE — 85027 COMPLETE CBC AUTOMATED: CPT

## 2019-11-29 PROCEDURE — 93005 ELECTROCARDIOGRAM TRACING: CPT

## 2019-11-29 PROCEDURE — 36415 COLL VENOUS BLD VENIPUNCTURE: CPT

## 2019-11-29 PROCEDURE — 81003 URINALYSIS AUTO W/O SCOPE: CPT

## 2019-11-29 NOTE — ED
HPI Febrile Illness





- HPI Summary


HPI Summary: 


69-year-old female presents with fever for the past 2 days.  States that 

started to notice the rash on her left leg was spreading.  Has history 

cellulitis on this leg that started with these symptoms.  Has history of 

chronic lymphedema.  States that her legs have been more swollen than normal.  

She has had a slight cough.  No chest pain or shortness of breath currently.  

Did have abdominal pain that is resolved.  Did have an episode of vomiting.  

Denies any urinary symptoms.  No sinus congestion.  No one else is sick.  Has 

history of diabetes.  She has had a rash on her legs bilaterally since the last 

time was admitted for such in july.  she has history of dvt in left leg that is 

on eliquis for. she had tyenlol at [facility]. flu neg [facility].  she was placed on 

clindamycin while admitted and has follow up with dr ansh.  





- History of Current Complaint


Chief Complaint: EDRashSkinAbscess


Time Seen by Provider: 11/29/19 19:05


Pain Intensity: 8





- Additional Pertinent History


Primary Care Physician: FARHAN





- Allergy/Home Medications


Allergies/Adverse Reactions: 


 Allergies











Allergy/AdvReac Type Severity Reaction Status Date / Time


 


clindamycin Allergy  Rash And Verified 11/29/19 19:32





   Itching  


 


ibuprofen Allergy  Unknown Verified 11/29/19 19:28





   Reaction  





   Details  


 


metformin Allergy  Unknown Verified 11/29/19 19:28





   Reaction  





   Details  


 


NSAIDS (Non-Steroidal Allergy  Unknown Verified 11/29/19 19:28





Anti-Inflamma   Reaction  





   Details  


 


various narcotics AdvReac  Nausea Uncoded 11/29/19 19:28











Home Medications: 


 Home Medications





Insulin Detemir 24 units SUBCUT QPM 11/29/19 [History Confirmed 11/29/19]


Torsemide TAB* 20 mg PO EVERY OTHER DAY 11/29/19 [History Confirmed 11/29/19]











PMH/Surg Hx/FS Hx/Imm Hx


Endocrine/Hematology History: Reports: Hx Diabetes, Hx Thyroid Disease


Cardiovascular History: Reports: Hx Angina, Hx Hypercholesterolemia, Hx 

Hypertension, Hx Peripheral Vascular Disease


   Denies: Hx Cardiomegaly, Hx Congenital Heart Disease, Hx Congestive Heart 

Failure, Hx Pacemaker/ICD


Respiratory History: 


   Denies: Hx Asthma - AS A CHILD, Hx Cystic Fibrosis, Hx Pleural Effusion, Hx 

Pneumonia, Hx Pulmonary Embolism


GI History: Reports: Hx Diverticulosis, Hx Gastroesophageal Reflux Disease, Hx 

Hiatal Hernia, Hx Irritable Bowel, Other GI Disorders - DIFFICULTY SWALLOWING, 

DIARRHEA/CONSTIPATION


 History: Reports: Other  Problems/Disorders - INCONTINENCE


   Denies: Hx Dialysis, Hx Renal Disease


Musculoskeletal History: Reports: Hx Arthritis, Hx Back Problems, Hx Orthopedic 

Injury, Other Musculoskeletal History - WEAKNESS/TREMORS


Sensory History: Reports: Hx Cataracts, Hx Contacts or Glasses


   Denies: Hx Eye Injury, Hx Eye Prosthesis, Hx Legally Blind, Hx Macular 

Degeneration, Hx Vision Problem, Hx Hearing Aid, Hx Hearing Problem, Other 

Sensory Impairments


Opthamlomology History: Reports: Hx Cataracts, Hx Contacts or Glasses


   Denies: Hx Eye Injury, Hx Eye Prosthesis, Hx Legally Blind, Hx Macular 

Degeneration, Hx Vision Problem, Other Sensory Impairments


Neurological History: Reports: Hx Headaches, Other Neuro Impairments/Disorders 

- POST CONCUSSION SYNDROME, DIZZINESS


Psychiatric History: 


   Denies: Hx Depression, Hx Panic Disorder, Hx Suicide Attempt, Hx of Violent 

Episodes Against Others





- Surgical History


Surgery Procedure, Year, and Place: HYSTERECTOMY; ENDOMETRIOSIS SURGERY; RT AND 

LT BREAST BIOPSY; BREAST REDUCTION;.  NOVEMBER 2017 LUMBAR FUSION AND CYST 

REMOVAL;.  back surgery 2018


Infectious Disease History: No


Infectious Disease History: 


   Denies: Hx Clostridium Difficile, Hx Hepatitis, Hx of Known/Suspected MRSA, 

Hx Shingles, Hx Tuberculosis, Hx Known/Suspected VRE, Hx Known/Suspected VRSA, 

History Other Infectious Disease, Traveled Outside the [place] in Last 30 Days





- Family History


Known Family History: Positive: Diabetes, Other - prostate and skin cancer, Non-

Contributory





- Social History


Alcohol Use: Rare


Alcohol Amount: wine


Hx Substance Use: No


Substance Use Type: Reports: None


Hx Tobacco Use: No


Smoking Status (MU): Never Smoked Tobacco


Have You Smoked in the Last Year: No





Review of Systems


Positive: Fever, Chills


Negative: Chest Pain


Positive: Cough.  Negative: Shortness Of Breath


Positive: Abdominal Pain, Nausea.  Negative: Diarrhea


Positive: Edema


Positive: Rash


All Other Systems Reviewed And Are Negative: Yes





Physical Exam


Triage Information Reviewed: Yes


Vital Signs On Initial Exam: 


 Initial Vitals











Temp Pulse Resp BP Pulse Ox


 


 101.7 F   96   22   147/56   97 


 


 11/29/19 18:42  11/29/19 18:42  11/29/19 18:42  11/29/19 18:42  11/29/19 18:42











Vital Signs Reviewed: Yes


Appearance: Positive: Well-Appearing


Skin: Positive: Other - erythematous rash warm to touch of left lower leg


Head/Face: Positive: Normal Head/Face Inspection


Eyes: Positive: Normal, Conjunctiva Clear


ENT: Positive: Pharynx normal


Respiratory/Lung Sounds: Positive: Clear to Auscultation, Breath Sounds Present


Cardiovascular: Positive: Normal, RRR


Musculoskeletal: Positive: Normal


Neurological: Positive: Normal


Psychiatric: Positive: Normal





Procedures





- Sedation


Patient Received Moderate/Deep Sedation with Procedure: No





Diagnostics





- Vital Signs


 Vital Signs











  Temp Pulse Resp BP Pulse Ox


 


 11/29/19 19:20  99.8 F    


 


 11/29/19 18:42  101.7 F  96  22  147/56  97














- Laboratory


Result Diagrams: 


 11/29/19 19:25





 11/29/19 19:25


Lab Statement: Any lab studies that have been ordered have been reviewed, and 

results considered in the medical decision making process.





- Radiology


  ** chest


Radiology Interpretation Completed By: ED Physician


Summary of Radiographic Findings: no pneumonia





- Ultrasound


  ** No standard instances


Ultrasound Interpretation Completed By: Radiologist


Summary of Ultrasound Findings: IMPRESSION: No evidence of DVT in the left leg.





- EKG


  ** No standard instances


Cardiac Rate: NL


EKG Rhythm: Sinus Rhythm


EKG Comparison: No Significant Change


Summary of EKG Findings: sinus rhythm, RBBB





Re-Evaluation





- Re-Evaluation


  ** First Eval


Re-Evaluation Time: 21:17


Change: Improved


Comment: feeling better, discused that patient feels awful whenever she is on 

diuretics.  states she is only able to take torsemide per dr sesay but she has 

severe side effects with such





Course/Dx





- Course


Course Of Treatment: 69-year-old female presents with fever for the past 2 

days.  States that started to notice the rash on her left leg was spreading.  

Has history cellulitis on this leg that started with these symptoms.  Has 

history of chronic lymphedema.  States that her legs have been more swollen 

than normal.  She has had a slight cough.  No chest pain or shortness of breath 

currently.  Did have abdominal pain that is resolved.  Did have an episode of 

vomiting.  Denies any urinary symptoms.  No sinus congestion.  No one else is 

sick.  Has history of diabetes.  She has had a rash on her legs bilaterally 

since the last time was admitted for such in july.  On exam has erythematous 

rashes of left lower leg that is warm to touch.  lungs CTA.  Abdomen soft 

nontender. wbc 19. with significant lymphedema did not give full fluid bolus. 

crp elevated. Cr is 1.54. bnp is 523. troponin .04. u/s shows no dvt. discussed 

case with dr harrington who agrees to admit.





- Febrile Illness


Differential Diagnoses: Cellulitis, Pneumonia, Viremia





- Diagnoses


Provider Diagnoses: 


 Cellulitis, Fluid overload








Discharge ED





- Sign-Out/Discharge


Documenting (check all that apply): Patient Departure





- Discharge Plan


Condition: Stable


Disposition: ADMITTED TO [facility]





- Billing Disposition and Condition


Condition: STABLE


Disposition: Admitted to [facility]

## 2019-11-29 NOTE — UC
HPI Febrile Illness





- HPI Summary


HPI Summary: 


18 HOURS OF SHAKING CHILLS AND FEVER---WORSENING ERYTHEMA, PAIN IN SWELLING IN 

ble (HX OF CELLULITIS AND LYMPHEDEMA) No nausea vomiting diarrhea respiratory 

or URI sx--no recent illness exposure





- History of Current Complaint


Chief Complaint: UCGeneralIllness


Time Seen by Provider: 11/29/19 17:37


Hx Obtained From: Patient


Onset/Duration: Started Hours Ago - 12, Atraumatic


Timing: Constant


Initial Severity: Moderate


Current Severity: Moderate


Pain Intensity: 5


Pain Scale Used: 0-10 Numeric


Alleviating Factors: OTC Medicine


Associated Signs and Symptoms: Arthralgia, Chills, Myalgia, Swelling





- Additional Pertinent History


Primary Care Physician: FARHAN





- Allergy/Home Medications


Allergies/Adverse Reactions: 


 Allergies











Allergy/AdvReac Type Severity Reaction Status Date / Time


 


clindamycin Allergy  Rash And Verified 11/29/19 19:32





   Itching  


 


ibuprofen Allergy  Unknown Verified 11/29/19 19:28





   Reaction  





   Details  


 


metformin Allergy  Unknown Verified 11/29/19 19:28





   Reaction  





   Details  


 


NSAIDS (Non-Steroidal Allergy  Unknown Verified 11/29/19 19:28





Anti-Inflamma   Reaction  





   Details  


 


various narcotics AdvReac  Nausea Uncoded 11/29/19 19:28














PMH/Surg Hx/FS Hx/Imm Hx


Previously Healthy: No


Endocrine History: Diabetes


Cardiovascular History: Hypertension


Other History Of: Anticoagulant Therapy - DVT





- Surgical History


Surgical History: Yes


Surgery Procedure, Year, and Place: HYSTERECTOMY; ENDOMETRIOSIS SURGERY; RT AND 

LT BREAST BIOPSY; BREAST REDUCTION;.  NOVEMBER 2017 LUMBAR FUSION AND CYST 

REMOVAL;.  back surgery 2018





- Family History


Known Family History: Positive: Diabetes, Other - prostate and skin cancer, Non-

Contributory





- Social History


Occupation: Retired


Lives: With Family


Alcohol Use: Rare


Alcohol Amount: wine


Substance Use Type: None


Smoking Status (MU): Never Smoked Tobacco


Have You Smoked in the Last Year: No





- Immunization History


Most Recent Influenza Vaccination: 2018


Most Recent Pneumonia Vaccination: 2016





Review of Systems


All Other Systems Reviewed And Are Negative: Yes


Constitutional: Positive: Fever, Chills, Fatigue


Skin: Positive: Other - spreading warmith and erythema both lower extremities-

left leg erythema is above her knee


Eyes: Positive: Negative


ENT: Positive: Negative


Respiratory: Positive: Negative


Cardiovascular: Positive: Negative


Gastrointestinal: Positive: Negative


Genitourinary: Positive: Negative


Motor: Positive: Negative


Neurovascular: Positive: Negative


Musculoskeletal: Positive: Arthralgia, Calf Tenderness, Edema, Myalgia


Neurological: Positive: Weakness


Psychological: Positive: Negative


Is Patient Immunocompromised?: No





Physical Exam


Triage Information Reviewed: Yes


Appearance: Ill-Appearing, Pain Distress, Obese


Vital Signs: 


 Initial Vital Signs











Temp  103.4 F   11/29/19 17:23


 


Pulse  95   11/29/19 17:23


 


Resp  22   11/29/19 17:23


 


BP  113/45   11/29/19 17:23


 


Pulse Ox  97   11/29/19 17:23











Vital Signs Reviewed: Yes


Eye Exam: Normal


Eyes: Positive: Conjunctiva Clear


ENT Exam: Normal


ENT: Positive: Normal ENT inspection, Hearing grossly normal, Pharynx normal.  

Negative: Trismus, Muffled voice, Hoarse voice


Dental Exam: Normal


Neck exam: Normal


Neck: Positive: Supple, Nontender


Respiratory Exam: Normal


Respiratory: Positive: Chest non-tender, Lungs clear, Normal breath sounds, No 

respiratory distress, No accessory muscle use


Cardiovascular Exam: Normal


Cardiovascular: Positive: RRR, No Murmur, Pulses Normal, Brisk Capillary Refill


Musculoskeletal Exam: Other


Musculoskeletal: Positive: Strength Limited @ - chronic in lower legs, ROM 

Limited @ - chronic in feet, Edema @


Neurological Exam: Normal


Neurological: Positive: Alert


Psychological Exam: Normal


Psychological: Positive: Normal Response To Family


Skin: Positive: Other - hot red warm swollen lower extremities L>R





Course/Dx





- Course


Course Of Treatment: 





to ed for further assessment , treatment and likely admission 





- Diagnoses


Provider Diagnosis: 


 Sepsis, Cellulitis








Discharge ED





- Sign-Out/Discharge


Documenting (check all that apply): Patient Departure


All imaging exams completed and their final reports reviewed: No Studies





- Discharge Plan


Condition: Guarded


Disposition: HOME-RECOMMEND TO ED


Patient Education Materials:  Cellulitis (ED)


Referrals: 


Angelo Jolly MD [Primary Care Provider] - 


Additional Instructions: 


Please go directly to the EMERGENCY DEPARTMENT for further care





- Billing Disposition and Condition


Condition: GUARDED


Disposition: Home-Recommend to ED

## 2019-11-30 LAB
ANION GAP SERPL CALC-SCNC: 7 MMOL/L (ref 2–11)
BASOPHILS # BLD AUTO: 0 10^3/UL (ref 0–0.2)
BUN SERPL-MCNC: 22 MG/DL (ref 6–24)
BUN/CREAT SERPL: 15.5 (ref 8–20)
CALCIUM SERPL-MCNC: 8.9 MG/DL (ref 8.6–10.3)
CHLORIDE SERPL-SCNC: 105 MMOL/L (ref 101–111)
EOSINOPHIL # BLD AUTO: 0 10^3/UL (ref 0–0.6)
GLUCOSE SERPL-MCNC: 145 MG/DL (ref 70–100)
HCO3 SERPL-SCNC: 24 MMOL/L (ref 22–32)
HCT VFR BLD AUTO: 30 % (ref 35–47)
HGB BLD-MCNC: 9.9 G/DL (ref 12–16)
LYMPHOCYTES # BLD AUTO: 1.2 10^3/UL (ref 1–4.8)
MCH RBC QN AUTO: 28 PG (ref 27–31)
MCHC RBC AUTO-ENTMCNC: 33 G/DL (ref 31–36)
MCV RBC AUTO: 85 FL (ref 80–97)
MONOCYTES # BLD AUTO: 0.5 10^3/UL (ref 0–0.8)
NEUTROPHILS # BLD AUTO: 14 10^3/UL (ref 1.5–7.7)
NRBC # BLD AUTO: 0 10^3/UL
NRBC BLD QL AUTO: 0
PLATELET # BLD AUTO: 194 10^3/UL (ref 150–450)
POTASSIUM SERPL-SCNC: 3.9 MMOL/L (ref 3.5–5)
RBC # BLD AUTO: 3.55 10^6 /UL (ref 3.7–4.87)
RBC UR QL AUTO: (no result)
SODIUM SERPL-SCNC: 136 MMOL/L (ref 135–145)
WBC # BLD AUTO: 15.7 10^3/UL (ref 3.5–10.8)
WBC UR QL AUTO: (no result)

## 2019-11-30 RX ADMIN — VALSARTAN SCH MG: 40 TABLET ORAL at 02:59

## 2019-11-30 RX ADMIN — CEFAZOLIN SCH MLS/HR: 1 INJECTION, POWDER, FOR SOLUTION INTRAVENOUS at 21:31

## 2019-11-30 RX ADMIN — SPIRONOLACTONE SCH MG: 25 TABLET ORAL at 08:39

## 2019-11-30 RX ADMIN — INSULIN ASPART SCH UNIT: 100 INJECTION, SOLUTION INTRAVENOUS; SUBCUTANEOUS at 18:03

## 2019-11-30 RX ADMIN — CEFAZOLIN SCH MLS/HR: 1 INJECTION, POWDER, FOR SOLUTION INTRAVENOUS at 12:26

## 2019-11-30 RX ADMIN — METOPROLOL SUCCINATE SCH MG: 25 TABLET, EXTENDED RELEASE ORAL at 08:39

## 2019-11-30 RX ADMIN — APIXABAN SCH MG: 5 TABLET, FILM COATED ORAL at 08:38

## 2019-11-30 RX ADMIN — APIXABAN SCH MG: 5 TABLET, FILM COATED ORAL at 21:29

## 2019-11-30 RX ADMIN — INSULIN DETEMIR SCH UNIT: 100 INJECTION, SOLUTION SUBCUTANEOUS at 03:12

## 2019-11-30 RX ADMIN — INSULIN ASPART SCH UNIT: 100 INJECTION, SOLUTION INTRAVENOUS; SUBCUTANEOUS at 21:36

## 2019-11-30 RX ADMIN — INSULIN ASPART SCH UNIT: 100 INJECTION, SOLUTION INTRAVENOUS; SUBCUTANEOUS at 08:33

## 2019-11-30 RX ADMIN — VALSARTAN SCH MG: 40 TABLET ORAL at 21:30

## 2019-11-30 RX ADMIN — INSULIN ASPART SCH UNIT: 100 INJECTION, SOLUTION INTRAVENOUS; SUBCUTANEOUS at 12:26

## 2019-11-30 RX ADMIN — CEFAZOLIN SCH MLS/HR: 1 INJECTION, POWDER, FOR SOLUTION INTRAVENOUS at 05:31

## 2019-11-30 RX ADMIN — INSULIN DETEMIR SCH UNIT: 100 INJECTION, SOLUTION SUBCUTANEOUS at 21:31

## 2019-11-30 RX ADMIN — DEXLANSOPRAZOLE SCH MG: 30 CAPSULE, DELAYED RELEASE ORAL at 08:38

## 2019-11-30 NOTE — HP
CC:  Dr. Jolly; Dr. Hdez

 

HISTORY AND PHYSICAL:

 

DATE OF ADMISSION:  11/29/19

 

PRIMARY CARE PROVIDER:  Dr. Jolly.

 

CHIEF COMPLAINT:  Fever.

 

HISTORY OF PRESENT ILLNESS:  Mrs. Geronimo is a 69-year-old female with a history of chronic lymphedem
a and recurrent cellulitis, who presented to our hospital complaining of a fever of 104 degrees at ho
me.  She had a fever of 103 degrees in the emergency room.  She also noted more swelling and erythema
, especially in the left leg.  She is going to be admitted with a diagnoses of sepsis and cellulitis.


 

PAST MEDICAL HISTORY:

1.  History of bilateral leg lymphedema and with recurrent cellulitis.

2.  DVT, diagnosed in July 2019.

3.  Hypertension.

4.  Gastroesophageal reflux disease.

5.  Drummond's esophagus.

6.  Diabetes type 2, insulin dependent.

7.  Chronic kidney disease, stage 3.

8.  Hypertension.

9.  Status post hysterectomy.

10.  Breast reduction.

11.  Spinal fusion.

 

HOME MEDICATIONS:  Include:

1.  Torsemide 20 mg every other day.

2.  Insulin detemir 24 units q.p.m., 26 units q.a.m.

3.  Eliquis 5 mg b.i.d.

4.  Acetaminophen on a p.r.n. basis.

5.  Insulin NovoLog on a sliding scale.

6.  Dexilant 30 mg daily.

7.  Metoprolol succinate 25 mg daily.

8.  Diovan 40 mg at bedtime.

9.  Aldactone 25 mg daily.

10.  Omega-3 fatty acids at 1000 mg a day.

11.  Multivitamin 1 tab daily.

 

ALLERGIES:  Include CLINDAMYCIN causes rash; IBUPROFEN, the patient does not take due to her chronic 
kidney disease; METFORMIN, unknown reaction; NARCOTICS cause nausea.

 

FAMILY HISTORY:  Positive for father with history of quadruple bypass at the age of 85.  Mother with 
history of basal cell cancer of the skin.  Brother with fibrosarcoma.

 

SOCIAL HISTORY:  The patient lives with her , who is her surrogate.  She denies any tobacco, a
lcohol, or drug use.  She uses a cane or walker for ambulation.  She is a full code.

 

Please note that the patient has problems with medications being produced in [place] and she takes only
 medications that are not made in [place] and she prefers non- generic medications.  Therefore, she req
uested to use all of her home medications by herself including insulin.

 

REVIEW OF SYSTEMS:  Positive for fever up to 104 degrees for the past 24 hours. Positive for nausea, 
no vomiting.

 

Positive for worsening leg edema and left leg skin erythema.

 

All the remaining 12 systems were reviewed with the patient and were otherwise negative.

 

                               PHYSICAL EXAMINATION

 

GENERAL:  The patient is a very pleasant 69-year-old female, who is in no acute distress.  The patien
t is alert and oriented x3.

 

VITAL SIGNS:  Blood pressure of 130/50, heart rate of 83 and regular, respiratory rate of 22, oxygen 
saturation 92% on room air, temperature of 103.4 on presentation, currently 99.8.

 

HEENT:  Head atraumatic and normocephalic.  Eyes:  Pupils are equal, round, and reactive to light and
 accommodation.  Oropharynx clear.  Mucosa dry.

 

NECK:  Supple.  No JVD.  No bruits bilaterally.

 

RESPIRATORY:  Clear to auscultation bilaterally.

 

CARDIOVASCULAR:  Regular rate and rhythm.  No murmur.

 

ABDOMEN:  Soft, nontender.  Bowel sounds present in all 4 quadrants.

 

EXTREMITIES:  Bilateral leg lymphedema noted.  Pulses +2 bilaterally.  There is no clubbing, no cyano
sis.  There is chronic lymphedema associated erythema of right lower extremity, noted distally in bet
ween the ankle and approximately skin surrounding the 10 cm within the ankle on the right.  On the le
ft, there is mild erythema with cellulitis with edema surrounding the area of between the patient's a
nkle and the patient's knee level.  Streaking of the skin noted.  There are areas of ulcers covered w
ith eschar on the anterior aspect of the shin on the left side. These are approximately 2 cm in diame
ter and there are couple of them.

 

There are no other skin abnormalities noted.

 

NEUROLOGIC:  On neuro evaluation, speech is clear.  Cranial nerves II through XII grossly intact.  Mo
tor strength is 5/5 bilaterally.

 

 DIAGNOSTIC STUDIES/LAB DATA:  White blood cell count 19.1, hemoglobin 10.7, hematocrit of 31, and pl
atelets of 219.

 

Sodium of 134, potassium 3.8, chloride 103, carbon dioxide 22, BUN 24, creatinine 1.54.  Liver functi
on tests unremarkable.  Glucose of 191, lactic acid 1.6. Troponin of 0.04. C-reactive protein of 250.


 

The patient's EKG showed normal sinus rhythm with a heart rate of 87 beats per minute with right bund
le-branch block.

 

Venous Doppler study was negative for DVT.

 

ASSESSMENT AND PLAN:

1.  Sepsis in patient with history of lymphedema due to cellulitis.  The patient is going to continue
 on the Keflex that was started in the emergency room.  Last time, she was on antibiotics with doxycy
samson in October 2019.  She already received a bolus of intravenous fluid and there is history of nee
ding diuretic, I will not start more IV fluids.  Blood cultures were obtained in the ED.

2.  In regards the patient's chronic renal insufficiency, it is slightly worse than before.  Neverthe
less, she has marked edema.  I will continue checking the patient's weight daily and continue her diu
retics including torsemide and Aldactone.

3.  For diabetes, I will continue her insulin sliding scale with aspart, which as per the patient is 
going to be her own insulin, which she requested.  We will also continue her Levemir, but at the lowe
r dose at 10 units daily.  That can be titrated as needed.

4.  For deep vein thrombosis diagnosed in July 2019, we will continue the patient's Eliquis at 5 mg b
.i.d.

5.  For hypertension, her valsartan and metoprolol are going to be continued.

6.  For DVT prophylaxis, once again the patient is already on Eliquis.

7.  The patient's code status is full.  Her surrogate is her .

 

TIME SPENT:  Approximately 72 minutes were spent on the admission of this patient, more than half louie
t time was spent face-to-face with the patient during the interview and physical exam.

 

 

 

[id number]/[facility] #: [id number]

## 2019-11-30 NOTE — PN
Subjective


Date of Service: 11/30/19


Interval History: 








No acute events overnight. fever resolved. WBC improved. 


Had Tmax 104.8 on Thanksgiving along with freezing chills and what sounds like 

rigors. Did not take her prn doxy (was waiting for call back from ID)


Some abdominal bloating/discomfort


Does see a  2x/week but increasingly deconditioned as was told 

to stay in bed with legs elevated unless absolutely necessary.  with 

parkinson's


Just had an ECHO done with Dr. Garcia last week, was not called with any 

results yet. 


Pain in the legs

















Objective


Active Medications: 








Al Hydrox/Mg Hydrox/Simethicone (Maalox Plus*)  30 ml PO Q6H PRN


   PRN Reason: INDIGESTION


Apixaban (Eliquis*)  5 mg PO BID [facility]


   Last Admin: 11/30/19 08:38 Dose:  5 mg


Dexlansoprazole (Dexilant (Nf))  30 mg PO DAILY [facility]


   Last Admin: 11/30/19 08:38 Dose:  30 mg


Dextrose (Dextrose 50% Vial 50 Ml*)  25 ml IV PUSH .FOR FS < 60 - SS PRN


   PRN Reason: FS < 60


Docusate Sodium (Colace Cap*)  100 mg PO BID PRN


   PRN Reason: constopation


Cefazolin Sodium 1 gm/ Sodium (Chloride)  50 mls @ 200 mls/hr IVPB Q8H [facility]


   Last Admin: 11/30/19 05:31 Dose:  200 mls/hr


Insulin Aspart (Novolog (Nf))  0 units SUBCUT ACHS [facility]; Protocol


   Last Admin: 11/30/19 08:33 Dose:  1 unit


Insulin Detemir (Levemir (Nf))  10 unit SUBCUT BEDTIME [facility]


   Last Admin: 11/30/19 03:12 Dose:  10 unit


Magnesium Hydroxide (Milk Of Magnesia Liq*)  30 ml PO Q4H PRN


   PRN Reason: CONSTIPATION


Metoprolol Succinate (Toprol Xl Tab*)  25 mg PO DAILY [facility]


   Last Admin: 11/30/19 08:39 Dose:  25 mg


Pto: Acetaminophen (500 Mg Caplets)  1 dose PO Q4H PRN


   PRN Reason: PAIN-MILD/TEMP >/= 100.4


Oxycodone/Acetaminophen (Percocet 5/325 Tab*)  1 tab PO Q4H PRN


   PRN Reason: PAIN - MODERATE


Senna (Senokot 8.6 Mg Tab*)  1 tab PO BID PRN


   PRN Reason: CONSTIPATION


Spironolactone (Aldactone Tab*)  25 mg PO DAILY [facility]


   Last Admin: 11/30/19 08:39 Dose:  25 mg


Temazepam (Restoril Cap*)  15 mg PO BEDTIME PRN


   PRN Reason: INSOMNIA


Torsemide (Demadex*)  20 mg PO MoWeSa@2100 [facility]


Valsartan (Diovan Tab*)  40 mg PO BEDTIME [facility]


   Last Admin: 11/30/19 02:59 Dose:  40 mg








 Vital Signs - 8 hr











  11/30/19





  04:38


 


Temperature 99.5 F


 


Pulse Rate 90


 


Respiratory 18





Rate 


 


Blood Pressure 157/55





(mmHg) 


 


O2 Sat by Pulse 98





Oximetry 











Oxygen Devices in Use Now: None


Appearance: NAD


Eyes: No Scleral Icterus


Neck: NL Appearance and Movements; NL JVP, Trachea Midline


Respiratory: Symmetrical Chest Expansion and Respiratory Effort, Clear to 

Auscultation


Cardiovascular: NL Sounds; No Murmurs; No JVD, RRR


Abdominal: - - soft, nontender, obese


Extremities: - - 2+ nonpitting edema, erythema extending to knee on left. 

warmth lower legs bilaterally. crusted ulceration on anterior and posterior 

left lower extremity, smaller ulcerations on right lower extremity, no drainage 

or fluctuance.


Skin: - - as above


Neurological: Alert and Oriented x 3, NL Muscle Strength and Tone


Result Diagrams: 


 11/30/19 04:51





 11/30/19 04:51


Additional Lab and Data: 





 Laboratory Results - last 24 hr











  11/29/19 11/29/19 11/29/19





  19:25 19:25 19:25


 


WBC  19.1 H  


 


RBC  3.74  


 


Hgb  10.7 L  


 


Hct  31 L  


 


MCV  83  


 


MCH  29  


 


MCHC  35  


 


RDW  14  


 


Plt Count  219  


 


MPV  9.5  


 


Neut % (Auto)  93.2  


 


Lymph % (Auto)  4.2  


 


Mono % (Auto)  2.1  


 


Eos % (Auto)  0.0  


 


Baso % (Auto)  0.5  


 


Absolute Neuts (auto)  17.8 H  


 


Absolute Lymphs (auto)  0.8 L  


 


Absolute Monos (auto)  0.4  


 


Absolute Eos (auto)  0.0  


 


Absolute Basos (auto)  0.1  


 


Absolute Nucleated RBC  0.0  


 


Nucleated RBC %  0.0  


 


INR (Anticoag Therapy)   2.40 H 


 


APTT   39.5 H 


 


Sodium    134 L


 


Potassium    3.8


 


Chloride    103


 


Carbon Dioxide    22


 


Anion Gap    9


 


BUN    24


 


Creatinine    1.54 H


 


Est GFR ( Amer)    40.4


 


Est GFR (Non-Af Amer)    33.4


 


BUN/Creatinine Ratio    15.6


 


Glucose    191 H


 


POC Glucose (mg/dL)   


 


Lactic Acid   


 


Calcium    8.8


 


Total Bilirubin    0.90


 


AST    25


 


ALT    17


 


Alkaline Phosphatase    57


 


Troponin I    0.04 H*


 


C-Reactive Protein    250.55 H


 


B-Natriuretic Peptide   


 


Total Protein    6.3 L


 


Albumin    3.5


 


Globulin    2.8


 


Albumin/Globulin Ratio    1.3


 


Urine Color   


 


Urine Appearance   


 


Urine pH   


 


Ur Specific Gravity   


 


Urine Protein   


 


Urine Ketones   


 


Urine Blood   


 


Urine Nitrate   


 


Urine Bilirubin   


 


Urine Urobilinogen   


 


Ur Leukocyte Esterase   


 


Urine WBC (Auto)   


 


Urine RBC (Auto)   


 


Ur Squamous Epith Cells   


 


Urine Bacteria   


 


Urine Glucose   














  11/29/19 11/29/19 11/29/19





  19:25 19:25 22:21


 


WBC   


 


RBC   


 


Hgb   


 


Hct   


 


MCV   


 


MCH   


 


MCHC   


 


RDW   


 


Plt Count   


 


MPV   


 


Neut % (Auto)   


 


Lymph % (Auto)   


 


Mono % (Auto)   


 


Eos % (Auto)   


 


Baso % (Auto)   


 


Absolute Neuts (auto)   


 


Absolute Lymphs (auto)   


 


Absolute Monos (auto)   


 


Absolute Eos (auto)   


 


Absolute Basos (auto)   


 


Absolute Nucleated RBC   


 


Nucleated RBC %   


 


INR (Anticoag Therapy)   


 


APTT   


 


Sodium   


 


Potassium   


 


Chloride   


 


Carbon Dioxide   


 


Anion Gap   


 


BUN   


 


Creatinine   


 


Est GFR ( Amer)   


 


Est GFR (Non-Af Amer)   


 


BUN/Creatinine Ratio   


 


Glucose   


 


POC Glucose (mg/dL)   


 


Lactic Acid  1.6  


 


Calcium   


 


Total Bilirubin   


 


AST   


 


ALT   


 


Alkaline Phosphatase   


 


Troponin I    0.03 H*


 


C-Reactive Protein   


 


B-Natriuretic Peptide   523 H 


 


Total Protein   


 


Albumin   


 


Globulin   


 


Albumin/Globulin Ratio   


 


Urine Color   


 


Urine Appearance   


 


Urine pH   


 


Ur Specific Gravity   


 


Urine Protein   


 


Urine Ketones   


 


Urine Blood   


 


Urine Nitrate   


 


Urine Bilirubin   


 


Urine Urobilinogen   


 


Ur Leukocyte Esterase   


 


Urine WBC (Auto)   


 


Urine RBC (Auto)   


 


Ur Squamous Epith Cells   


 


Urine Bacteria   


 


Urine Glucose   














  11/30/19 11/30/19 11/30/19





  02:59 04:15 04:51


 


WBC    15.7 H


 


RBC    3.55 L


 


Hgb    9.9 L


 


Hct    30 L


 


MCV    85


 


MCH    28


 


MCHC    33


 


RDW    15


 


Plt Count    194


 


MPV    9.4


 


Neut % (Auto)    88.8


 


Lymph % (Auto)    7.9


 


Mono % (Auto)    3.0


 


Eos % (Auto)    0.1


 


Baso % (Auto)    0.2


 


Absolute Neuts (auto)    14.0 H


 


Absolute Lymphs (auto)    1.2


 


Absolute Monos (auto)    0.5


 


Absolute Eos (auto)    0.0


 


Absolute Basos (auto)    0.0


 


Absolute Nucleated RBC    0.0


 


Nucleated RBC %    0.0


 


INR (Anticoag Therapy)   


 


APTT   


 


Sodium   


 


Potassium   


 


Chloride   


 


Carbon Dioxide   


 


Anion Gap   


 


BUN   


 


Creatinine   


 


Est GFR ( Amer)   


 


Est GFR (Non-Af Amer)   


 


BUN/Creatinine Ratio   


 


Glucose   


 


POC Glucose (mg/dL)  168 H  


 


Lactic Acid   


 


Calcium   


 


Total Bilirubin   


 


AST   


 


ALT   


 


Alkaline Phosphatase   


 


Troponin I   


 


C-Reactive Protein   


 


B-Natriuretic Peptide   


 


Total Protein   


 


Albumin   


 


Globulin   


 


Albumin/Globulin Ratio   


 


Urine Color   Yellow 


 


Urine Appearance   Cloudy 


 


Urine pH   5.0 


 


Ur Specific Gravity   1.030 


 


Urine Protein   1+(30 mg/dl) A 


 


Urine Ketones   Trace A 


 


Urine Blood   1+ A 


 


Urine Nitrate   Negative 


 


Urine Bilirubin   Negative 


 


Urine Urobilinogen   Negative 


 


Ur Leukocyte Esterase   3+ A 


 


Urine WBC (Auto)   3+(>20/hpf) A 


 


Urine RBC (Auto)   1+(3-5/hpf) A 


 


Ur Squamous Epith Cells   Present A 


 


Urine Bacteria   1+ A 


 


Urine Glucose   Negative 














  11/30/19 11/30/19





  04:51 07:40


 


WBC  


 


RBC  


 


Hgb  


 


Hct  


 


MCV  


 


MCH  


 


MCHC  


 


RDW  


 


Plt Count  


 


MPV  


 


Neut % (Auto)  


 


Lymph % (Auto)  


 


Mono % (Auto)  


 


Eos % (Auto)  


 


Baso % (Auto)  


 


Absolute Neuts (auto)  


 


Absolute Lymphs (auto)  


 


Absolute Monos (auto)  


 


Absolute Eos (auto)  


 


Absolute Basos (auto)  


 


Absolute Nucleated RBC  


 


Nucleated RBC %  


 


INR (Anticoag Therapy)  


 


APTT  


 


Sodium  136 


 


Potassium  3.9 


 


Chloride  105 


 


Carbon Dioxide  24 


 


Anion Gap  7 


 


BUN  22 


 


Creatinine  1.42 H 


 


Est GFR ( Amer)  44.4 


 


Est GFR (Non-Af Amer)  36.7 


 


BUN/Creatinine Ratio  15.5 


 


Glucose  145 H 


 


POC Glucose (mg/dL)   142 H


 


Lactic Acid  


 


Calcium  8.9 


 


Total Bilirubin  


 


AST  


 


ALT  


 


Alkaline Phosphatase  


 


Troponin I  


 


C-Reactive Protein  


 


B-Natriuretic Peptide  


 


Total Protein  


 


Albumin  


 


Globulin  


 


Albumin/Globulin Ratio  


 


Urine Color  


 


Urine Appearance  


 


Urine pH  


 


Ur Specific Gravity  


 


Urine Protein  


 


Urine Ketones  


 


Urine Blood  


 


Urine Nitrate  


 


Urine Bilirubin  


 


Urine Urobilinogen  


 


Ur Leukocyte Esterase  


 


Urine WBC (Auto)  


 


Urine RBC (Auto)  


 


Ur Squamous Epith Cells  


 


Urine Bacteria  


 


Urine Glucose  














Assess/Plan/Problems-Billing


Assessment: 





68 yo female PMH chronic lymphadema, grade II diastolic dysfunction (as of Aug 

2015), CKD III, obesity, IDDM p/w sepsis secondary to left lower extremity 

cellulitis.





- Patient Problems


(1) Sepsis


Current Visit: Yes   Status: Acute   Comment: fever, leukocytosis, HR >90, 

source LLE cellulitis. No lactic acidosis. 


s/p 1L NS fluid bolus but not full 30cc/kg dose given elevated BNP


continue Cefazolin. 


f/u BCxs


   





(2) Diastolic CHF


Current Visit: Yes   Status: Acute   Code(s): I50.30 - UNSPECIFIED DIASTOLIC (

CONGESTIVE) HEART FAILURE   SNOMED Code(s): 276429317


   Comment: Request records from Dr. Garcia who just last week ordered an 

outpatient ECHO. Prior was August 2015 which showed grade 1 diastolic 

dysfunction, EF 55-60%, mild MVR.


daily weights


strict io


continue torsemide and aldactone for now.    





(3) IDDM (insulin dependent diabetes mellitus)


Current Visit: Yes   Status: Acute   Code(s): E11.9 - TYPE 2 DIABETES MELLITUS 

WITHOUT COMPLICATIONS; Z79.4 - LONG TERM (CURRENT) USE OF INSULIN   SNOMED Code(

s): 77206802


   Comment: Lantus 10U qam (home was 26AM, 24AM)


SSI


POC glucose qachs   





(4) Cellulitis


Current Visit: No   Status: Acute   Code(s): L03.90 - CELLULITIS, UNSPECIFIED   

SNOMED Code(s): 863874540


   Comment: Hx of suspected Strep cellulitis in July. Now recurrent cellulitis. 

Has been following with ID Dr. Hdez as outpatient. Contiue cefazolin 1gm 

q8. Does not want retrial of clinda given generalized rash last time.    





(5) Lymphedema


Current Visit: No   Status: Acute   Code(s): I89.0 - LYMPHEDEMA, NOT ELSEWHERE 

CLASSIFIED   SNOMED Code(s): 468190421


   Comment: - [employer] LEs





She is still waiting on getting into [place] [facility] for over a year

, considering going to [place] (which I did recommend). Has also been seen by 

Kimberly Vidal in [place] but was not tolerant of compression systems offered 

at that time (felt like pushed fluid into abdomen)   





(6) Full code status


Current Visit: No   Status: Acute   Code(s): Z78.9 - OTHER SPECIFIED HEALTH 

STATUS   SNOMED Code(s): 814954624


   Comment: Full code


   





(7) DVT prophylaxis


Current Visit: No   Status: Acute   Code(s): Z29.9 - ENCOUNTER FOR PROPHYLACTIC 

MEASURES, UNSPECIFIED   SNOMED Code(s): 672990861


   Comment: 


 - Continue eliquis as per above.   





(8) Deep vein thrombosis (DVT) of left lower extremity


Current Visit: No   Status: Acute   Code(s): I82.402 - ACUTE EMBOLISM AND 

THOMBOS UNSP DEEP VEINS OF L LOW EXTREM   SNOMED Code(s): 899675110


   Comment: 


 - LLE superficial femoral vein back in July, Seeing heme onc as outpatient. 


 - duplex 11/29 did not show DVT on left


 - continue eliquis  5 mg BID   


Status and Disposition: 





medicine inpatient for sepsis.

## 2019-11-30 NOTE — HP
HISTORY AND PHYSICAL:

 

ADDENDUM:  Please note that the patient's troponin was 0.04.  It is likely due 
to demand ischemia.  The patient complains of no chest pain, no shortness of 
breath, and no other cardiac symptoms.  She has no acute changes on the EKG.  
Nevertheless, the patient is going to be observed in telemetry monitored bed 
and a second troponin is going to be obtained.

 

 

 

[id number]/CPS #: [id number]

MTDD

## 2019-12-01 LAB
ANION GAP SERPL CALC-SCNC: 10 MMOL/L (ref 2–11)
BASOPHILS # BLD AUTO: 0.1 10^3/UL (ref 0–0.2)
BUN SERPL-MCNC: 18 MG/DL (ref 6–24)
BUN/CREAT SERPL: 12.5 (ref 8–20)
CALCIUM SERPL-MCNC: 8.8 MG/DL (ref 8.6–10.3)
CHLORIDE SERPL-SCNC: 102 MMOL/L (ref 101–111)
EOSINOPHIL # BLD AUTO: 0.3 10^3/UL (ref 0–0.6)
GLUCOSE SERPL-MCNC: 130 MG/DL (ref 70–100)
HCO3 SERPL-SCNC: 24 MMOL/L (ref 22–32)
HCT VFR BLD AUTO: 30 % (ref 35–47)
HGB BLD-MCNC: 10.1 G/DL (ref 12–16)
LYMPHOCYTES # BLD AUTO: 2 10^3/UL (ref 1–4.8)
MAGNESIUM SERPL-MCNC: 1.9 MG/DL (ref 1.9–2.7)
MCH RBC QN AUTO: 29 PG (ref 27–31)
MCHC RBC AUTO-ENTMCNC: 34 G/DL (ref 31–36)
MCV RBC AUTO: 85 FL (ref 80–97)
MONOCYTES # BLD AUTO: 0.9 10^3/UL (ref 0–0.8)
NEUTROPHILS # BLD AUTO: 12.6 10^3/UL (ref 1.5–7.7)
NRBC # BLD AUTO: 0 10^3/UL
NRBC BLD QL AUTO: 0
PLATELET # BLD AUTO: 208 10^3/UL (ref 150–450)
POTASSIUM SERPL-SCNC: 3.4 MMOL/L (ref 3.5–5)
RBC # BLD AUTO: 3.51 10^6 /UL (ref 3.7–4.87)
SODIUM SERPL-SCNC: 136 MMOL/L (ref 135–145)
WBC # BLD AUTO: 15.8 10^3/UL (ref 3.5–10.8)

## 2019-12-01 RX ADMIN — DEXLANSOPRAZOLE SCH MG: 30 CAPSULE, DELAYED RELEASE ORAL at 08:52

## 2019-12-01 RX ADMIN — INSULIN ASPART SCH UNIT: 100 INJECTION, SOLUTION INTRAVENOUS; SUBCUTANEOUS at 09:07

## 2019-12-01 RX ADMIN — INSULIN ASPART SCH UNIT: 100 INJECTION, SOLUTION INTRAVENOUS; SUBCUTANEOUS at 08:51

## 2019-12-01 RX ADMIN — APIXABAN SCH MG: 5 TABLET, FILM COATED ORAL at 22:04

## 2019-12-01 RX ADMIN — CEFAZOLIN SCH MLS/HR: 1 INJECTION, POWDER, FOR SOLUTION INTRAVENOUS at 05:19

## 2019-12-01 RX ADMIN — METOPROLOL SUCCINATE SCH MG: 25 TABLET, EXTENDED RELEASE ORAL at 08:51

## 2019-12-01 RX ADMIN — INSULIN ASPART SCH UNIT: 100 INJECTION, SOLUTION INTRAVENOUS; SUBCUTANEOUS at 22:07

## 2019-12-01 RX ADMIN — INSULIN ASPART SCH UNIT: 100 INJECTION, SOLUTION INTRAVENOUS; SUBCUTANEOUS at 12:28

## 2019-12-01 RX ADMIN — APIXABAN SCH MG: 5 TABLET, FILM COATED ORAL at 08:51

## 2019-12-01 RX ADMIN — INSULIN DETEMIR SCH UNIT: 100 INJECTION, SOLUTION SUBCUTANEOUS at 21:57

## 2019-12-01 RX ADMIN — SPIRONOLACTONE SCH MG: 25 TABLET ORAL at 08:52

## 2019-12-01 RX ADMIN — VALSARTAN SCH MG: 40 TABLET ORAL at 22:05

## 2019-12-01 RX ADMIN — INSULIN ASPART SCH: 100 INJECTION, SOLUTION INTRAVENOUS; SUBCUTANEOUS at 17:12

## 2019-12-01 RX ADMIN — INSULIN ASPART SCH UNITS: 100 INJECTION, SOLUTION INTRAVENOUS; SUBCUTANEOUS at 12:28

## 2019-12-01 NOTE — PN
Subjective


Date of Service: 12/01/19


Interval History: 








No acute events overnight, afebrile. Tmax 99.2 at 3am


still getting episodes of chills and drenching sweats. 


Pain in posterior superior left calf ranging between 4 at rest to 9 with 

certain movements getting to commode. 


WBC and CRP essentailly unchanged at 15.8 and 253


developed intermittent productive cough yesterday. Denies SOB. 


Appetite poor, still feels like abdomen is "hard"


has large volume of urine in commode that she has been saving as requested but 

that has not been documented. 





Has been getting hypoglycemia to 48 about 5 times over the last two weeks on 

her new home insulin regimen (usually ~4pm, symptomatic, takes 1-2 of her 10g 

carb fruit squares), sometimes skips lunch. 

















Objective


Active Medications: 








Al Hydrox/Mg Hydrox/Simethicone (Maalox Plus*)  30 ml PO Q6H PRN


   PRN Reason: INDIGESTION


Apixaban (Eliquis*)  5 mg PO BID [facility]


   Last Admin: 12/01/19 08:51 Dose:  5 mg


Dexlansoprazole (Dexilant (Nf))  30 mg PO DAILY [facility]


   Last Admin: 12/01/19 08:52 Dose:  30 mg


Dextrose (Dextrose 50% Vial 50 Ml*)  25 ml IV PUSH .FOR FS < 60 - SS PRN


   PRN Reason: FS < 60


Docusate Sodium (Colace Cap*)  100 mg PO BID PRN


   PRN Reason: constopation


Cefazolin Sodium 1 gm/ Sodium (Chloride)  50 mls @ 200 mls/hr IVPB Q8H [facility]


   Last Admin: 12/01/19 05:19 Dose:  200 mls/hr


Insulin Aspart (Novolog (Nf))  0 units SUBCUT ACHS [facility]; Protocol


   Last Admin: 11/30/19 21:36 Dose:  4 unit


Insulin Aspart (Novolog (Nf))  3 unit SUBCUT ONCE ONE


   Stop: 12/01/19 09:07


Insulin Detemir (Levemir (Nf))  10 unit SUBCUT BEDTIME [facility]


   Last Admin: 11/30/19 21:31 Dose:  10 unit


Magnesium Hydroxide (Milk Of Magnesia Liq*)  30 ml PO Q4H PRN


   PRN Reason: CONSTIPATION


Metoprolol Succinate (Toprol Xl Tab*)  25 mg PO DAILY [facility]


   Last Admin: 12/01/19 08:51 Dose:  25 mg


Pto: Acetaminophen (500 Mg Caplets)  1 dose PO Q4H PRN


   PRN Reason: PAIN-MILD/TEMP >/= 100.4


   Last Admin: 11/30/19 11:17 Dose:  1 dose


Oxycodone/Acetaminophen (Percocet 5/325 Tab*)  1 tab PO Q4H PRN


   PRN Reason: PAIN - MODERATE


Potassium Chloride (Potassium Chloride Liquid)  40 meq PO ONCE ONE


   Stop: 12/01/19 10:01


Senna (Senokot 8.6 Mg Tab*)  1 tab PO BID PRN


   PRN Reason: CONSTIPATION


Spironolactone (Aldactone Tab*)  25 mg PO DAILY [facility]


   Last Admin: 12/01/19 08:52 Dose:  25 mg


Temazepam (Restoril Cap*)  15 mg PO BEDTIME PRN


   PRN Reason: INSOMNIA


Torsemide (Demadex*)  20 mg PO MoWeSa@2100 [facility]


   Last Admin: 11/30/19 21:29 Dose:  20 mg


Valsartan (Diovan Tab*)  40 mg PO BEDTIME [facility]


   Last Admin: 11/30/19 21:30 Dose:  40 mg








 Vital Signs - 8 hr











  12/01/19 12/01/19





  03:35 07:15


 


Temperature 99.2 F 98.6 F


 


Pulse Rate 80 80


 


Respiratory 16 18





Rate  


 


Blood Pressure 144/63 137/55





(mmHg)  


 


O2 Sat by Pulse 95 97





Oximetry  











Oxygen Devices in Use Now: None


Appearance: NAD


Eyes: No Scleral Icterus


Respiratory: Symmetrical Chest Expansion and Respiratory Effort, Clear to 

Auscultation


Cardiovascular: NL Sounds; No Murmurs; No JVD


Abdominal: - - soft, nontender, obese


Extremities: - - nonpitting edema in b/l LE. left leg erythema unchanged up to 

the line below knee. warmth unchanged. right extremity  with some scabbed 

ulcerations, warm but much less erythematous. 


Neurological: Alert and Oriented x 3, NL Sensation


Nutrition: Taking PO's


Result Diagrams: 


 12/01/19 05:34





 12/01/19 05:34


Additional Lab and Data: 





  Laboratory Results - last 24 hr











  11/30/19 11/30/19 11/30/19





  04:51 16:58 21:21


 


WBC   


 


RBC   


 


Hgb   


 


Hct   


 


MCV   


 


MCH   


 


MCHC   


 


RDW   


 


Plt Count   


 


MPV   


 


Neut % (Auto)   


 


Lymph % (Auto)   


 


Mono % (Auto)   


 


Eos % (Auto)   


 


Baso % (Auto)   


 


Absolute Neuts (auto)   


 


Absolute Lymphs (auto)   


 


Absolute Monos (auto)   


 


Absolute Eos (auto)   


 


Absolute Basos (auto)   


 


Absolute Nucleated RBC   


 


Nucleated RBC %   


 


ESR  78 H  


 


Sodium   


 


Potassium   


 


Chloride   


 


Carbon Dioxide   


 


Anion Gap   


 


BUN   


 


Creatinine   


 


Est GFR ( Amer)   


 


Est GFR (Non-Af Amer)   


 


BUN/Creatinine Ratio   


 


Glucose   


 


POC Glucose (mg/dL)   144 H  219 H


 


Calcium   


 


Magnesium   


 


C-Reactive Protein   














  12/01/19 12/01/19 12/01/19





  05:34 05:34 07:40


 


WBC  15.8 H  


 


RBC  3.51 L  


 


Hgb  10.1 L  


 


Hct  30 L  


 


MCV  85  


 


MCH  29  


 


MCHC  34  


 


RDW  15  


 


Plt Count  208  


 


MPV  9.4  


 


Neut % (Auto)  79.5  


 


Lymph % (Auto)  12.5  


 


Mono % (Auto)  5.5  


 


Eos % (Auto)  2.0  


 


Baso % (Auto)  0.5  


 


Absolute Neuts (auto)  12.6 H  


 


Absolute Lymphs (auto)  2.0  


 


Absolute Monos (auto)  0.9 H  


 


Absolute Eos (auto)  0.3  


 


Absolute Basos (auto)  0.1  


 


Absolute Nucleated RBC  0.0  


 


Nucleated RBC %  0.0  


 


ESR   


 


Sodium   136 


 


Potassium   3.4 L 


 


Chloride   102 


 


Carbon Dioxide   24 


 


Anion Gap   10 


 


BUN   18 


 


Creatinine   1.44 H 


 


Est GFR ( Amer)   43.7 


 


Est GFR (Non-Af Amer)   36.1 


 


BUN/Creatinine Ratio   12.5 


 


Glucose   130 H 


 


POC Glucose (mg/dL)    133 H


 


Calcium   8.8 


 


Magnesium   1.9 


 


C-Reactive Protein   253.11 H 














  12/01/19





  11:57


 


WBC 


 


RBC 


 


Hgb 


 


Hct 


 


MCV 


 


MCH 


 


MCHC 


 


RDW 


 


Plt Count 


 


MPV 


 


Neut % (Auto) 


 


Lymph % (Auto) 


 


Mono % (Auto) 


 


Eos % (Auto) 


 


Baso % (Auto) 


 


Absolute Neuts (auto) 


 


Absolute Lymphs (auto) 


 


Absolute Monos (auto) 


 


Absolute Eos (auto) 


 


Absolute Basos (auto) 


 


Absolute Nucleated RBC 


 


Nucleated RBC % 


 


ESR 


 


Sodium 


 


Potassium 


 


Chloride 


 


Carbon Dioxide 


 


Anion Gap 


 


BUN 


 


Creatinine 


 


Est GFR ( Amer) 


 


Est GFR (Non-Af Amer) 


 


BUN/Creatinine Ratio 


 


Glucose 


 


POC Glucose (mg/dL)  172 H


 


Calcium 


 


Magnesium 


 


C-Reactive Protein 











Microbiology and Other Data: 





 Microbiology





11/30/19 04:15   Urine   Urine Culture - Final


11/29/19 19:25   Blood Venous   Aerobic Blood Culture - Preliminary


                            No Growth Day 1


11/29/19 19:25   Blood Venous   Anaerobic Blood Culture - Preliminary


                            No Growth Day 1


11/29/19 19:25   Blood Venous   Aerobic Blood Culture - Preliminary


                            No Growth Day 1


11/29/19 19:25   Blood Venous   Anaerobic Blood Culture - Preliminary


                            No Growth Day 1




















Assess/Plan/Problems-Billing


Assessment: 





68 yo female PMH chronic lymphadema, grade II diastolic dysfunction (as of Aug 

2015), CKD III, obesity, IDDM p/w sepsis secondary to left lower extremity 

cellulitis. BNP newly elevated to 500.





- Patient Problems


(1) Sepsis


Current Visit: Yes   Status: Acute   Comment: fever, leukocytosis, HR >90, 

source LLE cellulitis. No lactic acidosis. 


s/p 1L NS fluid bolus in ED but did not get full 30cc/kg dose. presented with 

newly elevated BNP


f/u BCxs. NGTDx1 day


Not any improvement in cellulitis exam or labs with persistent leukocytosis and 

unchanged CRP. Will switch to vancomycin and get a MRSA nares.


   





(2) Diastolic CHF


Current Visit: Yes   Status: Acute   Code(s): I50.30 - UNSPECIFIED DIASTOLIC (

CONGESTIVE) HEART FAILURE   SNOMED Code(s): 342067543


   Comment: Requested records from Dr. Garcia who just last week did an 

outpatient ECHO (Pt has not been called with results yet). Prior was August 2015 (available through [facility] records) which showed grade 2 diastolic 

dysfunction, EF 55-60%, mild MVR.


daily weights


strict io (please document!!, have requested to RN)


continue metoprolol and aldactone.


got her home (every other day) torsemide 20mg last night. Will switch to lasix 

20mg IV daily     





(3) IDDM (insulin dependent diabetes mellitus)


Current Visit: Yes   Status: Acute   Code(s): E11.9 - TYPE 2 DIABETES MELLITUS 

WITHOUT COMPLICATIONS; Z79.4 - LONG TERM (CURRENT) USE OF INSULIN   SNOMED Code(

s): 30199776


   Comment: Increase Lantus from 10U to 15U qpm. 


BG mid 150-220s


Of note home was 26U AM, 24U PM with 10/10/14 but of note was getting frequent 

symptomatic hypogylcemia to 48  five times in last 2 weeks. )


add lispro 3/3/3 qac with continued additional SSI coverage


POC glucose qachs   





(4) Cellulitis


Current Visit: No   Status: Acute   Code(s): L03.90 - CELLULITIS, UNSPECIFIED   

SNOMED Code(s): 624995136


   Comment: Hx of suspected Strep cellulitis in July. Now recurrent cellulitis. 

Has been following with ID Dr. Hdez as outpatient. Was on cefazolin 1gm q8 

but will switch to vancomycin as did not really improve via labs or exam since 

yesterday


Does not want retrial of clinda given generalized rash last time (though of 

note she did complete the course)   





(5) Lymphedema


Current Visit: No   Status: Acute   Code(s): I89.0 - LYMPHEDEMA, NOT ELSEWHERE 

CLASSIFIED   SNOMED Code(s): 594899839


   Comment: - [employer] LEs





She is still waiting on getting into [facility] for over a year

, considering going to [place] (which I did recommend). Has also been seen by 

Kimberly Vidal in [place] but was not tolerant of compression systems offered 

at that time (felt like pushed fluid into abdomen)   





(6) Full code status


Current Visit: No   Status: Acute   Code(s): Z78.9 - OTHER SPECIFIED HEALTH 

STATUS   SNOMED Code(s): 747903490


   Comment: Full code


   





(7) DVT prophylaxis


Current Visit: No   Status: Acute   Code(s): Z29.9 - ENCOUNTER FOR PROPHYLACTIC 

MEASURES, UNSPECIFIED   SNOMED Code(s): 955001982


   Comment: 


 - Continue eliquis as per above.   





(8) Deep vein thrombosis (DVT) of left lower extremity


Current Visit: No   Status: Acute   Code(s): I82.402 - ACUTE EMBOLISM AND 

THOMBOS UNSP DEEP VEINS OF L LOW EXTREM   SNOMED Code(s): 014125036


   Comment: 


 - LLE superficial femoral vein back in July, Seeing heme onc as outpatient. 


 - duplex 11/29 did not show DVT on left


 - continue eliquis  5 mg BID   


Status and Disposition: 





medicine inpatient for sepsis 2/2 cellulitis. PT rec home or outpatient PT 

services.

## 2019-12-02 LAB
ANION GAP SERPL CALC-SCNC: 9 MMOL/L (ref 2–11)
BASOPHILS # BLD AUTO: 0.1 10^3/UL (ref 0–0.2)
BUN SERPL-MCNC: 17 MG/DL (ref 6–24)
BUN/CREAT SERPL: 14.7 (ref 8–20)
CALCIUM SERPL-MCNC: 8.9 MG/DL (ref 8.6–10.3)
CHLORIDE SERPL-SCNC: 104 MMOL/L (ref 101–111)
EOSINOPHIL # BLD AUTO: 0.6 10^3/UL (ref 0–0.6)
GLUCOSE SERPL-MCNC: 116 MG/DL (ref 70–100)
HCO3 SERPL-SCNC: 26 MMOL/L (ref 22–32)
HCT VFR BLD AUTO: 31 % (ref 35–47)
HGB BLD-MCNC: 10.4 G/DL (ref 12–16)
LYMPHOCYTES # BLD AUTO: 2.2 10^3/UL (ref 1–4.8)
MAGNESIUM SERPL-MCNC: 2.2 MG/DL (ref 1.9–2.7)
MCH RBC QN AUTO: 28 PG (ref 27–31)
MCHC RBC AUTO-ENTMCNC: 34 G/DL (ref 31–36)
MCV RBC AUTO: 84 FL (ref 80–97)
MONOCYTES # BLD AUTO: 0.9 10^3/UL (ref 0–0.8)
NEUTROPHILS # BLD AUTO: 7.6 10^3/UL (ref 1.5–7.7)
NRBC # BLD AUTO: 0 10^3/UL
NRBC BLD QL AUTO: 0
PLATELET # BLD AUTO: 238 10^3/UL (ref 150–450)
POTASSIUM SERPL-SCNC: 3.7 MMOL/L (ref 3.5–5)
RBC # BLD AUTO: 3.68 10^6 /UL (ref 3.7–4.87)
SODIUM SERPL-SCNC: 139 MMOL/L (ref 135–145)
WBC # BLD AUTO: 11.5 10^3/UL (ref 3.5–10.8)

## 2019-12-02 RX ADMIN — APIXABAN SCH MG: 5 TABLET, FILM COATED ORAL at 09:07

## 2019-12-02 RX ADMIN — SPIRONOLACTONE SCH MG: 25 TABLET ORAL at 09:09

## 2019-12-02 RX ADMIN — INSULIN ASPART SCH: 100 INJECTION, SOLUTION INTRAVENOUS; SUBCUTANEOUS at 07:30

## 2019-12-02 RX ADMIN — INSULIN ASPART SCH UNIT: 100 INJECTION, SOLUTION INTRAVENOUS; SUBCUTANEOUS at 17:06

## 2019-12-02 RX ADMIN — INSULIN ASPART SCH UNITS: 100 INJECTION, SOLUTION INTRAVENOUS; SUBCUTANEOUS at 17:07

## 2019-12-02 RX ADMIN — INSULIN DETEMIR SCH UNIT: 100 INJECTION, SOLUTION SUBCUTANEOUS at 22:41

## 2019-12-02 RX ADMIN — VANCOMYCIN HYDROCHLORIDE SCH MLS/HR: 1 INJECTION, POWDER, LYOPHILIZED, FOR SOLUTION INTRAVENOUS at 22:40

## 2019-12-02 RX ADMIN — METOPROLOL SUCCINATE SCH MG: 25 TABLET, EXTENDED RELEASE ORAL at 09:09

## 2019-12-02 RX ADMIN — APIXABAN SCH MG: 5 TABLET, FILM COATED ORAL at 22:39

## 2019-12-02 RX ADMIN — VANCOMYCIN HYDROCHLORIDE SCH MLS/HR: 1 INJECTION, POWDER, LYOPHILIZED, FOR SOLUTION INTRAVENOUS at 11:11

## 2019-12-02 RX ADMIN — INSULIN ASPART SCH UNIT: 100 INJECTION, SOLUTION INTRAVENOUS; SUBCUTANEOUS at 12:43

## 2019-12-02 RX ADMIN — INSULIN ASPART SCH UNITS: 100 INJECTION, SOLUTION INTRAVENOUS; SUBCUTANEOUS at 12:44

## 2019-12-02 RX ADMIN — INSULIN ASPART SCH UNIT: 100 INJECTION, SOLUTION INTRAVENOUS; SUBCUTANEOUS at 22:44

## 2019-12-02 RX ADMIN — DEXLANSOPRAZOLE SCH MG: 30 CAPSULE, DELAYED RELEASE ORAL at 09:08

## 2019-12-02 RX ADMIN — VALSARTAN SCH MG: 40 TABLET ORAL at 22:39

## 2019-12-02 NOTE — PN
Subjective


Date of Service: 12/02/19


Interval History: 





No significant events overnight. Patient believes her leg is a bit worse but 

denies fevers, chills. States she is not urinating as much as when she had a 

furosemide IV dose, but still having normal UOP. Denies SOB, abd pain, n/v/c/d, 

dysuria. Very concerned over testing MRSA positive by nasal swab. Afebrile > 24 

hours on vanc.








Objective


Active Medications: 








Acetaminophen (Tylenol Tab*)  650 mg PO Q4H PRN


   PRN Reason: PAIN - MILD


Al Hydrox/Mg Hydrox/Simethicone (Maalox Plus*)  30 ml PO Q6H PRN


   PRN Reason: INDIGESTION


Apixaban (Eliquis*)  5 mg PO BID [facility]


   Last Admin: 12/02/19 09:07 Dose:  5 mg


Dexlansoprazole (Dexilant (Nf))  30 mg PO DAILY [facility]


   Last Admin: 12/02/19 09:08 Dose:  30 mg


Dextrose (Dextrose 50% Vial 50 Ml*)  25 ml IV PUSH .FOR FS < 60 - SS PRN


   PRN Reason: FS < 60


Vancomycin HCl 1,000 mg/ (Sodium Chloride)  250 mls @ 166.667 mls/hr IVPB 1100,

2300 [facility]


   Last Admin: 12/02/19 11:11 Dose:  166.667 mls/hr


Insulin Aspart (Novolog (Nf))  0 units SUBCUT ACHS [facility]; Protocol


   Last Admin: 12/02/19 12:43 Dose:  1 unit


Insulin Aspart (Novolog (Nf))  3 units SUBCUT AC [facility]


   Last Admin: 12/02/19 12:44 Dose:  3 units


Insulin Detemir (Levemir (Nf))  15 unit SUBCUT BEDTIME [facility]


   Last Admin: 12/01/19 21:57 Dose:  15 unit


Magnesium Hydroxide (Milk Of Magnesia Liq*)  30 ml PO Q4H PRN


   PRN Reason: CONSTIPATION


Metoprolol Succinate (Toprol Xl Tab*)  25 mg PO DAILY [facility]


   Last Admin: 12/02/19 09:09 Dose:  25 mg


Pharmacy Consult (Vancomycin Per Pharmacy*)  1 note FOLLOW UP .VANC PER 

PHARMACY [facility]; Protocol


Pharmacy Profile Note (Vancomycin Trough Check)  1 note FOLLOW UP 1030 [facility]


   Stop: 12/03/19 10:31


Senna (Senokot 8.6 Mg Tab*)  1 tab PO BID PRN


   PRN Reason: CONSTIPATION


Spironolactone (Aldactone Tab*)  25 mg PO DAILY [facility]


   Last Admin: 12/02/19 09:09 Dose:  25 mg


Temazepam (Restoril Cap*)  15 mg PO BEDTIME PRN


   PRN Reason: INSOMNIA


Valsartan (Diovan Tab*)  40 mg PO BEDTIME [facility]


   Last Admin: 12/01/19 22:05 Dose:  40 mg





 Vital Signs - 8 hr











  12/02/19





  07:59


 


Temperature 97.9 F


 


Pulse Rate 66


 


Respiratory 18





Rate 


 


Blood Pressure 150/50





(mmHg) 


 


O2 Sat by Pulse 98





Oximetry 











Oxygen Devices in Use Now: None


Appearance: well appearing, NAD, alert and interactive


Eyes: No Scleral Icterus


Ears/Nose/Mouth/Throat: Clear Oropharnyx, Mucous Membranes Moist


Neck: NL Appearance and Movements; NL JVP, Trachea Midline


Respiratory: Symmetrical Chest Expansion and Respiratory Effort, Clear to 

Auscultation


Cardiovascular: NL Sounds; No Murmurs; No JVD, RRR


Abdominal: NL Sounds; No Tenderness; No Distention, No Hepatosplenomegaly


Extremities: - - nonpitting edema in b/l LE, LLE with mild circumferential 

erythema up to marker line under knee, no purulence; RLE with scabbed ulcers; 

LLE warmer than RLE


Neurological: Alert and Oriented x 3


Result Diagrams: 


 12/02/19 10:13





 12/02/19 10:13


Additional Lab and Data: 





  Laboratory Results - last 24 hr











  11/30/19 11/30/19 11/30/19





  04:51 16:58 21:21


 


WBC   


 


RBC   


 


Hgb   


 


Hct   


 


MCV   


 


MCH   


 


MCHC   


 


RDW   


 


Plt Count   


 


MPV   


 


Neut % (Auto)   


 


Lymph % (Auto)   


 


Mono % (Auto)   


 


Eos % (Auto)   


 


Baso % (Auto)   


 


Absolute Neuts (auto)   


 


Absolute Lymphs (auto)   


 


Absolute Monos (auto)   


 


Absolute Eos (auto)   


 


Absolute Basos (auto)   


 


Absolute Nucleated RBC   


 


Nucleated RBC %   


 


ESR  78 H  


 


Sodium   


 


Potassium   


 


Chloride   


 


Carbon Dioxide   


 


Anion Gap   


 


BUN   


 


Creatinine   


 


Est GFR ( Amer)   


 


Est GFR (Non-Af Amer)   


 


BUN/Creatinine Ratio   


 


Glucose   


 


POC Glucose (mg/dL)   144 H  219 H


 


Calcium   


 


Magnesium   


 


C-Reactive Protein   














  12/01/19 12/01/19 12/01/19





  05:34 05:34 07:40


 


WBC  15.8 H  


 


RBC  3.51 L  


 


Hgb  10.1 L  


 


Hct  30 L  


 


MCV  85  


 


MCH  29  


 


MCHC  34  


 


RDW  15  


 


Plt Count  208  


 


MPV  9.4  


 


Neut % (Auto)  79.5  


 


Lymph % (Auto)  12.5  


 


Mono % (Auto)  5.5  


 


Eos % (Auto)  2.0  


 


Baso % (Auto)  0.5  


 


Absolute Neuts (auto)  12.6 H  


 


Absolute Lymphs (auto)  2.0  


 


Absolute Monos (auto)  0.9 H  


 


Absolute Eos (auto)  0.3  


 


Absolute Basos (auto)  0.1  


 


Absolute Nucleated RBC  0.0  


 


Nucleated RBC %  0.0  


 


ESR   


 


Sodium   136 


 


Potassium   3.4 L 


 


Chloride   102 


 


Carbon Dioxide   24 


 


Anion Gap   10 


 


BUN   18 


 


Creatinine   1.44 H 


 


Est GFR ( Amer)   43.7 


 


Est GFR (Non-Af Amer)   36.1 


 


BUN/Creatinine Ratio   12.5 


 


Glucose   130 H 


 


POC Glucose (mg/dL)    133 H


 


Calcium   8.8 


 


Magnesium   1.9 


 


C-Reactive Protein   253.11 H 














  12/01/19





  11:57


 


WBC 


 


RBC 


 


Hgb 


 


Hct 


 


MCV 


 


MCH 


 


MCHC 


 


RDW 


 


Plt Count 


 


MPV 


 


Neut % (Auto) 


 


Lymph % (Auto) 


 


Mono % (Auto) 


 


Eos % (Auto) 


 


Baso % (Auto) 


 


Absolute Neuts (auto) 


 


Absolute Lymphs (auto) 


 


Absolute Monos (auto) 


 


Absolute Eos (auto) 


 


Absolute Basos (auto) 


 


Absolute Nucleated RBC 


 


Nucleated RBC % 


 


ESR 


 


Sodium 


 


Potassium 


 


Chloride 


 


Carbon Dioxide 


 


Anion Gap 


 


BUN 


 


Creatinine 


 


Est GFR ( Amer) 


 


Est GFR (Non-Af Amer) 


 


BUN/Creatinine Ratio 


 


Glucose 


 


POC Glucose (mg/dL)  172 H


 


Calcium 


 


Magnesium 


 


C-Reactive Protein 











Microbiology and Other Data: 





 Microbiology





11/30/19 04:15   Urine   Urine Culture - Final


11/29/19 19:25   Blood Venous   Aerobic Blood Culture - Preliminary


                            No Growth Day 1


11/29/19 19:25   Blood Venous   Anaerobic Blood Culture - Preliminary


                            No Growth Day 1


11/29/19 19:25   Blood Venous   Aerobic Blood Culture - Preliminary


                            No Growth Day 1


11/29/19 19:25   Blood Venous   Anaerobic Blood Culture - Preliminary


                            No Growth Day 1




















Assess/Plan/Problems-Billing


Assessment: 





69W with chronic lymphadema, DVT on AC, HFpEF, CKD III, obesity, IDDM p/w 

sepsis secondary to LLE cellulitis. Also with signs of volume overload.








- Patient Problems


(1) Cellulitis


Comment: Hx of suspected Strep cellulitis in July. Now recurrent cellulitis. 

Has been following with ID Dr. Hdez as outpatient.


- changed cefazolin (11/29 - 12/1) to vanc IV (12/1- ) given no improvement


- pt does not want retrial of clinda given generalized rash last time (though 

of note she did complete the course)   





(2) Diastolic CHF


Comment: Pending records from Dr. Garcia for recent outpatient TTE. Prior 

was August 2015 (available through [facility] records), which showed grade 2 

diastolic dysfunction, EF 55-60%, mild MVR.


- furosemide 20mg IV daily, currently good response


- daily weights; strict Is&Os


- continue metoprolol and spironolactone


- switch home torsemide 20mg qod to lasix as above   





(3) IDDM (insulin dependent diabetes mellitus)


Comment: Home dose was 26U AM, 24U PM but was getting frequent symptomatic 

hypogylcemia to 48  five times in last 2 weeks.


- cont insulin detemir 15u daily


- add lispro 3/3/3 qac with continued additional SSI coverage


- POC glucose qachs   





(4) Deep vein thrombosis (DVT) of left lower extremity


Comment: LLE superficial femoral vein back in July, Seeing heme onc as 

outpatient. Duplex 11/29 did not show DVT on left.


 - continue eliquis  5 mg BID   





(5) Lymphedema


Comment: She is still pending [place] [facility] for over a year, 

considering going to [place]. Has also been seen by Kimberly Vidal in 

[place] but was not tolerant of compression systems offered at that time (felt 

like pushed fluid into abdomen).


- continue elevation


- diurese as above, as HF may be component of LE edema   





(6) DVT prophylaxis


Comment: 


 - Continue eliquis as per above.   





(7) Full code status


Comment: Full code


   


Status and Disposition: 





medicine inpatient for sepsis 2/2 cellulitis. PT rec home or outpatient PT 

services.

## 2019-12-03 LAB
ANION GAP SERPL CALC-SCNC: 10 MMOL/L (ref 2–11)
BASOPHILS # BLD AUTO: 0.1 10^3/UL (ref 0–0.2)
BUN SERPL-MCNC: 17 MG/DL (ref 6–24)
BUN/CREAT SERPL: 15.5 (ref 8–20)
CALCIUM SERPL-MCNC: 8.9 MG/DL (ref 8.6–10.3)
CHLORIDE SERPL-SCNC: 104 MMOL/L (ref 101–111)
EOSINOPHIL # BLD AUTO: 0.6 10^3/UL (ref 0–0.6)
GLUCOSE SERPL-MCNC: 175 MG/DL (ref 70–100)
HCO3 SERPL-SCNC: 25 MMOL/L (ref 22–32)
HCT VFR BLD AUTO: 31 % (ref 35–47)
HGB BLD-MCNC: 10.6 G/DL (ref 12–16)
LYMPHOCYTES # BLD AUTO: 2.2 10^3/UL (ref 1–4.8)
MAGNESIUM SERPL-MCNC: 2 MG/DL (ref 1.9–2.7)
MCH RBC QN AUTO: 28 PG (ref 27–31)
MCHC RBC AUTO-ENTMCNC: 34 G/DL (ref 31–36)
MCV RBC AUTO: 83 FL (ref 80–97)
MONOCYTES # BLD AUTO: 0.7 10^3/UL (ref 0–0.8)
NEUTROPHILS # BLD AUTO: 6.8 10^3/UL (ref 1.5–7.7)
NRBC # BLD AUTO: 0 10^3/UL
NRBC BLD QL AUTO: 0
PLATELET # BLD AUTO: 269 10^3/UL (ref 150–450)
POTASSIUM SERPL-SCNC: 3.8 MMOL/L (ref 3.5–5)
RBC # BLD AUTO: 3.77 10^6 /UL (ref 3.7–4.87)
SODIUM SERPL-SCNC: 139 MMOL/L (ref 135–145)
WBC # BLD AUTO: 10.4 10^3/UL (ref 3.5–10.8)

## 2019-12-03 RX ADMIN — INSULIN ASPART SCH: 100 INJECTION, SOLUTION INTRAVENOUS; SUBCUTANEOUS at 09:01

## 2019-12-03 RX ADMIN — APIXABAN SCH MG: 5 TABLET, FILM COATED ORAL at 20:41

## 2019-12-03 RX ADMIN — DEXLANSOPRAZOLE SCH MG: 30 CAPSULE, DELAYED RELEASE ORAL at 09:02

## 2019-12-03 RX ADMIN — METOPROLOL SUCCINATE SCH MG: 25 TABLET, EXTENDED RELEASE ORAL at 09:03

## 2019-12-03 RX ADMIN — FUROSEMIDE SCH MG: 10 INJECTION, SOLUTION INTRAMUSCULAR; INTRAVENOUS at 06:12

## 2019-12-03 RX ADMIN — INSULIN ASPART SCH: 100 INJECTION, SOLUTION INTRAVENOUS; SUBCUTANEOUS at 09:13

## 2019-12-03 RX ADMIN — SPIRONOLACTONE SCH MG: 25 TABLET ORAL at 09:04

## 2019-12-03 RX ADMIN — APIXABAN SCH MG: 5 TABLET, FILM COATED ORAL at 09:02

## 2019-12-03 RX ADMIN — VALSARTAN SCH MG: 40 TABLET ORAL at 20:41

## 2019-12-03 RX ADMIN — INSULIN DETEMIR SCH UNIT: 100 INJECTION, SOLUTION SUBCUTANEOUS at 20:56

## 2019-12-03 RX ADMIN — VANCOMYCIN HYDROCHLORIDE SCH: 1 INJECTION, POWDER, LYOPHILIZED, FOR SOLUTION INTRAVENOUS at 14:37

## 2019-12-03 RX ADMIN — INSULIN ASPART SCH UNITS: 100 INJECTION, SOLUTION INTRAVENOUS; SUBCUTANEOUS at 14:44

## 2019-12-03 NOTE — CONS
CONSULTATION REPORT:

 

DATE OF CONSULT:  12/03/19

 

PRIMARY CARE PROVIDER:  Dr. Angelo Jolly.

 

PROVIDER REQUESTING CONSULTATION:  Dr. Shae Hu.

 

CONSULTING SERVICE:  Infectious Disease.

 

PROVIDER:  Sukhjinder Miller NP

 

ATTENDING PROVIDER:  Dr. Moshe Hdez.* (DICTATED BY SUKHJINDER MILLER NP)

 

REASON FOR CONSULTATION:  Recurrent cellulitis.

 

IMPRESSION:

1.  Left lower extremity cellulitis.  Presented to the ED with fever and 
leukocytosis.  Her leukocytosis has resolved during her hospitalization.  She 
is currently afebrile.  Her CRP was elevated around 250 on admission and has 
improved down to 88.  She was initially on cefazolin, then transitioned to 
vancomycin when she was felt to have not been improving on the cefazolin.  She 
is receiving Lasix and the edema is resolving.  Urine culture with no growth.  
Denies any respiratory symptoms.

2.  Bilateral lower extremity lymphedema. Improving lower extremity edema since 
receiving increased diuretics while in the hospital. 

3.  History of left lower extremity deep vein thrombosis. Diagnosed in July and 
continues to be on Eliquis.

4.  Diabetes mellitus type 2.

5.  Diastolic congestive heart failure.

 

PLAN/RECOMMENDATIONS:  Recommend continuing vancomycin for today, and then 
switching to Doxycycline 100mg PO BID in the AM to complete a 10-14 day course. 
We will continue to follow along. 

 

HISTORY OF PRESENT ILLNESS:  Ms. Geronimo is a 69-year-old female with past 
medical history significant for bilateral lower extremity lymphedema, left 
lower extremity DVT diagnosed in July, hypertension, GERD, Drummond's esophagus, 
diabetes mellitus type 2, chronic kidney disease stage 3 and diastolic 
congestive heart failure, who has had bilateral lower extremity lymphedema with 
superficial wounds since July when she was treated for cellulitis and 
hospitalized at that time.  She is followed closely by Infectious Disease 
outpatient.  She had not been on any antibiotics since October, when she took a 
few days of doxycycline.  She was last seen in the office on 11/22/19 at which 
time she had slight erythema to both legs and superficial wounds with some 
weeping edema.  There was no warmth.  She was not having fevers and otherwise 
feeling well, it was not felt that she had cellulitis at that time. She was 
encouraged to use compression on her legs to help with edema management and to 
keep her legs elevated and to wash with soap and water.  She was hesitant to 
use dressings due to history of having worsening of the skin after wearing 
dressings in the past.  She had a prescription for Doxycycline on hand to start 
if her legs worsened.  She states that she was doing well until on Friday  when 
she developed a fever of 104 degrees Fahrenheit at home.  She called the ID 
office who recommended that she go to the emergency room for evaluation.  She 
did not want to go to the emergency room, so she first presented to urgent care 
who then transferred her to the emergency room.  In the emergency room, she was 
noted to have fever of 101.7, and leukocytosis with a white blood cell count of 
19.1.  She had a UA significant for 1+ blood, negative nitrites, 3+ leukocyte 
esterase, wbc's 3+, rbc's 1+, squamous epithelial cells present, bacteria 1+.  
She had noticed increased swelling, erythema to especially the left leg.  She 
was referred to the hospitalist service and admitted for sepsis in the setting 
of a left lower extremity cellulitis.  She initially was placed on cefazolin, 
this was then switched to vancomycin when she was not having significant 
improvement.  After starting the vancomycin, the erythema has improved. 
Additionally, she has had significant improvement in the edema, as has been 
receiving IV furosemide.  Her leukocytosis has resolved.  She has been 
afebrile. Currently denies fevers, chills, nausea, vomiting, diarrhea, urinary 
symptoms.  She feels that the edema in her legs is greatly improved in addition 
to the erythema since she presented to the hospital.  She continues to have 
some pain in the left lower leg, nut feels like overall this is improving. 

 

PAST MEDICAL HISTORY:

1.  Bilateral lower extremity lymphedema.

2.  Left lower extremity DVT diagnosed in July 2019.

3.  Hypertension.

4.  GERD.

5.  Drummond esophagus.

6.  Diabetes mellitus type 2.

7.  Chronic kidney disease stage 3.

8.  Diastolic congestive heart failure.

 

PAST SURGICAL HISTORY:

1.  Status post hysterectomy.

2.  Status post spinal fusion.

3.  Status post bilateral breast reduction.

 

MEDICATIONS: Home medications include:

1.  Torsemide 20 mg by mouth every other day.

2.  Detemir insulin 24 units subcutaneous in the evening and 26 units 
subcutaneous in the morning.

3.  Eliquis 5 mg by mouth twice daily.

4.  Acetaminophen 650 mg by mouth every 4 hours as needed for fever or pain.

5.  NovoLog insulin 2 to 11 units sliding scale with meals and at bedtime.

6.  Dexilant 30 mg by mouth daily.

7.  Metoprolol succinate 25 mg by mouth daily.

8.  Valsartan 40 mg by mouth daily.

9.  Spironolactone 25 mg by mouth daily.

10.  Fish oil 1000 mg by mouth daily.

11.  Multivitamin 1 tablet by mouth daily.

 

Hospital medications:

1.  Acetaminophen 650 mg by mouth every 4 hours as needed for mild pain.

2.  Maalox 30 mL by mouth every 6 hours as needed for indigestion.

3.  Eliquis 5 mg by mouth twice daily.

4.  Dexilant 30 mg by mouth daily.

5.  Dextrose 25 mL intravenously as needed for glucose less than 60.

6.  Furosemide 20 mg IV daily.

7.  Hydroxyzine 25 mg by mouth every 12 hours as needed for itching.

8.  Levemir insulin 15 units subcutaneous at bedtime.

9.  Humalog insulin sliding scale subcutaneous with meals.

10.  Milk of magnesia 30 mL by mouth every 4 hours as needed for constipation.

11.  Metoprolol succinate 25 mg by mouth daily.

12.  Senna 1 tablet by mouth twice daily as needed for constipation.

13.  Spironolactone 25 mg by mouth daily.

14.  Restoril 15 mg by mouth daily as needed for sleep.

15.  Valsartan 40 mg by mouth daily.

16.  Vancomycin 1000 mg IV every 12 hours.

 

ALLERGIES:  CLINDAMYCIN caused a rash and itching, IBUPROFEN, METFORMIN, NSAID 
and NARCOTICS.

 

FAMILY HISTORY:  Denies family history of recurrent or resistant infections. 
Father with a history of coronary artery disease.  No family history of 
diabetes. Mother with a history of basal cell carcinoma and brother with a 
history of a fibrosarcoma.

 

SOCIAL HISTORY:  Denies alcohol, tobacco or recreational drug use.

 

REVIEW OF SYSTEMS:  I performed a 10-point review of systems.  All the 
pertinent positives and negatives are mentioned in the history of present 
illness.  The remaining review of systems are negative.

 

PHYSICAL EXAM:  Vital Signs:  Temperature 98.1, heart rate 67, respiratory rate 
18, O2 sat 98% on room air, blood pressure 132/52.  General Appearance:  Alert, 
appears to be in no acute distress, sitting up in bed.  Head:  Normocephalic, 
atraumatic. EENT:  Extraocular movements are intact.  No subconjunctival 
hemorrhage.  Moist mucous membranes.  Neck:  Supple.  No lymphadenopathy noted.
  Neurological:  Alert and oriented.  Cranial nerves II through XII are grossly 
intact.  She moves all extremities.  Cardiovascular:  Regular rate and rhythm.  
S1, S2 present.  No murmurs, rubs, or gallops heard.  Respiratory:  Lungs are 
clear to auscultation bilateral.  No accessory muscle use.  Abdomen:  Bowel 
sounds present x4.  Abdomen is soft, nontender, nondistended, obese.  
Extremities:  DP pulses are 2+ and symmetric.  Nonpitting bilateral lower 
extremity edema.  Musculoskeletal:  No clubbing or cyanosis noted.  She 
exhibits good strength in all extremities. Psychological:  Calm and 
cooperative.  Skin:  She has erythema to the right lower extremity, this has 
receded within the previously drawn lines.  There is still redness to the 
posterior leg below the knee down.  Additionally, there is some dry crusty 
skin.  No open lesions.  The right lower extremity has slight erythema on the 
anterior shin with some healed crusted areas.  No drainage.  Her arms and lower 
back have healing and scabbed lesions.

 

DIAGNOSTIC STUDIES/LAB DATA:  Sodium 139, potassium 3.8, chloride 104, CO2 of 25
, BUN 17, creatinine 1.10, glucose 175, CRP 88.70.  WBC 10.4, hemoglobin 10.6, 
hematocrit 31, platelet count 269.  Blood cultures with no growth to date.  
Urine culture with no significant growth.

 

Please see impression and recommendations outlined above, recommendations have 
been discussed with Dr. Shae Hu.

 

Thank you for asking us to see Ms. Geronimo in consultation.

 

The case has been reviewed with the attending, Dr. Moshe Hdez, who agrees 
with the plan of care.

 

Reviewed by DIANNE ESPARZA  12/5/19  0903

 

[id number]/[facility] #: [id number]

ANJELICA

## 2019-12-03 NOTE — PN
Subjective


Date of Service: 12/03/19


Interval History: 





No acute overnight events. Pt reports legs are significantly better. She still 

has pain over the back of her lower Left leg, but the swelling and erythema 

have significantly improved. She was walking around the room more and did laps 

with PT.





No recurrence of fever. CRP decreased 253 -> 89. Leukocytosis resolved.








Objective


Active Medications: 








Acetaminophen (Tylenol Tab*)  650 mg PO Q4H PRN


   PRN Reason: PAIN - MILD


Al Hydrox/Mg Hydrox/Simethicone (Maalox Plus*)  30 ml PO Q6H PRN


   PRN Reason: INDIGESTION


Apixaban (Eliquis*)  5 mg PO BID [facility]


   Last Admin: 12/03/19 09:02 Dose:  5 mg


Dexlansoprazole (Dexilant (Nf))  30 mg PO DAILY [facility]


   Last Admin: 12/03/19 09:02 Dose:  30 mg


Dextrose (Dextrose 50% Vial 50 Ml*)  25 ml IV PUSH .FOR FS < 60 - SS PRN


   PRN Reason: FS < 60


Furosemide (Lasix Iv*)  20 mg IV 0600 [facility]


   Last Admin: 12/03/19 06:12 Dose:  20 mg


Vancomycin HCl 1,000 mg/ (Sodium Chloride)  250 mls @ 166.667 mls/hr IVPB 1100,

2300 [facility]


   Last Admin: 12/02/19 22:40 Dose:  166.667 mls/hr


Insulin Detemir (Levemir (Nf))  15 unit SUBCUT BEDTIME [facility]


   Last Admin: 12/02/19 22:41 Dose:  15 unit


Insulin Human Lispro (Humalog*)  0 units SUBCUT AC [facility]; Protocol


Magnesium Hydroxide (Milk Of Magnesia Liq*)  30 ml PO Q4H PRN


   PRN Reason: CONSTIPATION


Metoprolol Succinate (Toprol Xl Tab*)  25 mg PO DAILY [facility]


   Last Admin: 12/03/19 09:03 Dose:  25 mg


Pharmacy Consult (Vancomycin Per Pharmacy*)  1 note FOLLOW UP .VANC PER 

PHARMACY [facility]; Protocol


Senna (Senokot 8.6 Mg Tab*)  1 tab PO BID PRN


   PRN Reason: CONSTIPATION


Spironolactone (Aldactone Tab*)  25 mg PO DAILY [facility]


   Last Admin: 12/03/19 09:04 Dose:  25 mg


Temazepam (Restoril Cap*)  15 mg PO BEDTIME PRN


   PRN Reason: INSOMNIA


Valsartan (Diovan Tab*)  40 mg PO BEDTIME [facility]


   Last Admin: 12/02/19 22:39 Dose:  40 mg








 Vital Signs - 8 hr











  12/03/19 12/03/19





  04:00 07:56


 


Temperature 97.9 F 98.1 F


 


Pulse Rate 64 67


 


Respiratory 16 18





Rate  


 


Blood Pressure 137/72 132/52





(mmHg)  


 


O2 Sat by Pulse 100 98





Oximetry  











Oxygen Devices in Use Now: None


Result Diagrams: 


 12/03/19 10:48





 12/03/19 10:48


Additional Lab and Data: 





  Laboratory Results - last 24 hr











  11/30/19 11/30/19 11/30/19





  04:51 16:58 21:21


 


WBC   


 


RBC   


 


Hgb   


 


Hct   


 


MCV   


 


MCH   


 


MCHC   


 


RDW   


 


Plt Count   


 


MPV   


 


Neut % (Auto)   


 


Lymph % (Auto)   


 


Mono % (Auto)   


 


Eos % (Auto)   


 


Baso % (Auto)   


 


Absolute Neuts (auto)   


 


Absolute Lymphs (auto)   


 


Absolute Monos (auto)   


 


Absolute Eos (auto)   


 


Absolute Basos (auto)   


 


Absolute Nucleated RBC   


 


Nucleated RBC %   


 


ESR  78 H  


 


Sodium   


 


Potassium   


 


Chloride   


 


Carbon Dioxide   


 


Anion Gap   


 


BUN   


 


Creatinine   


 


Est GFR ( Amer)   


 


Est GFR (Non-Af Amer)   


 


BUN/Creatinine Ratio   


 


Glucose   


 


POC Glucose (mg/dL)   144 H  219 H


 


Calcium   


 


Magnesium   


 


C-Reactive Protein   














  12/01/19 12/01/19 12/01/19





  05:34 05:34 07:40


 


WBC  15.8 H  


 


RBC  3.51 L  


 


Hgb  10.1 L  


 


Hct  30 L  


 


MCV  85  


 


MCH  29  


 


MCHC  34  


 


RDW  15  


 


Plt Count  208  


 


MPV  9.4  


 


Neut % (Auto)  79.5  


 


Lymph % (Auto)  12.5  


 


Mono % (Auto)  5.5  


 


Eos % (Auto)  2.0  


 


Baso % (Auto)  0.5  


 


Absolute Neuts (auto)  12.6 H  


 


Absolute Lymphs (auto)  2.0  


 


Absolute Monos (auto)  0.9 H  


 


Absolute Eos (auto)  0.3  


 


Absolute Basos (auto)  0.1  


 


Absolute Nucleated RBC  0.0  


 


Nucleated RBC %  0.0  


 


ESR   


 


Sodium   136 


 


Potassium   3.4 L 


 


Chloride   102 


 


Carbon Dioxide   24 


 


Anion Gap   10 


 


BUN   18 


 


Creatinine   1.44 H 


 


Est GFR ( Amer)   43.7 


 


Est GFR (Non-Af Amer)   36.1 


 


BUN/Creatinine Ratio   12.5 


 


Glucose   130 H 


 


POC Glucose (mg/dL)    133 H


 


Calcium   8.8 


 


Magnesium   1.9 


 


C-Reactive Protein   253.11 H 














  12/01/19





  11:57


 


WBC 


 


RBC 


 


Hgb 


 


Hct 


 


MCV 


 


MCH 


 


MCHC 


 


RDW 


 


Plt Count 


 


MPV 


 


Neut % (Auto) 


 


Lymph % (Auto) 


 


Mono % (Auto) 


 


Eos % (Auto) 


 


Baso % (Auto) 


 


Absolute Neuts (auto) 


 


Absolute Lymphs (auto) 


 


Absolute Monos (auto) 


 


Absolute Eos (auto) 


 


Absolute Basos (auto) 


 


Absolute Nucleated RBC 


 


Nucleated RBC % 


 


ESR 


 


Sodium 


 


Potassium 


 


Chloride 


 


Carbon Dioxide 


 


Anion Gap 


 


BUN 


 


Creatinine 


 


Est GFR ( Amer) 


 


Est GFR (Non-Af Amer) 


 


BUN/Creatinine Ratio 


 


Glucose 


 


POC Glucose (mg/dL)  172 H


 


Calcium 


 


Magnesium 


 


C-Reactive Protein 











Microbiology and Other Data: 





 Microbiology





11/30/19 04:15   Urine   Urine Culture - Final


11/29/19 19:25   Blood Venous   Aerobic Blood Culture - Preliminary


                            No Growth Day 1


11/29/19 19:25   Blood Venous   Anaerobic Blood Culture - Preliminary


                            No Growth Day 1


11/29/19 19:25   Blood Venous   Aerobic Blood Culture - Preliminary


                            No Growth Day 1


11/29/19 19:25   Blood Venous   Anaerobic Blood Culture - Preliminary


                            No Growth Day 1




















Assess/Plan/Problems-Billing


Assessment: 





69W with chronic lymphadema, DVT on AC, HFpEF, CKD III, obesity, and IDDM, p/w 

sepsis secondary to LLE cellulitis. Also with signs of volume overload.








- Patient Problems


(1) Cellulitis


Comment: Hx of suspected Strep cellulitis in July. Now recurrent cellulitis. 

Has been following with ID Dr. Hdez as outpatient.


- changed cefazolin (11/29 - 12/1) to vanc IV (12/1- ) given no improvement


- pt does not want retrial of clinda given generalized rash last time (though 

of note she did complete the course)   





(2) Diastolic CHF


Comment: Pending records from Dr. Garcia for recent outpatient TTE. Prior 

was August 2015 (available through [facility] records), which showed grade 2 

diastolic dysfunction, EF 55-60%, mild MVR.


- furosemide 20mg IV daily, currently good response


- daily weights; strict Is&Os


- continue metoprolol and spironolactone


- switch home torsemide 20mg qod to lasix as above   





(3) IDDM (insulin dependent diabetes mellitus)


Comment: Home dose was 26U AM, 24U PM, with short-acting 10/10/14 tid, but was 

getting frequent symptomatic hypogylcemia to 48  five times in last 2 weeks.


- cont insulin detemir 15u daily


- add lispro sliding scale


- POC glucose qAC   





(4) Deep vein thrombosis (DVT) of left lower extremity


Comment: LLE superficial femoral vein back in July, Seeing heme onc as 

outpatient. Duplex 11/29 did not show DVT on left.


 - continue eliquis  5 mg BID   





(5) Lymphedema


Comment: She is still pending [place] [facility] for over a year, 

considering going to [place]. Has also been seen by Kimberly Vidal in 

[place] but was not tolerant of compression systems offered at that time (felt 

like pushed fluid into abdomen).


- continue elevation


- diurese as above, as HF may be component of LE edema   





(6) DVT prophylaxis


Comment: 


 - Continue eliquis as per above.   





(7) Full code status


Comment: Full code


   


Status and Disposition: 





medicine inpatient for sepsis 2/2 cellulitis. PT rec home or outpatient PT 

services.

## 2019-12-04 RX ADMIN — APIXABAN SCH MG: 5 TABLET, FILM COATED ORAL at 09:30

## 2019-12-04 RX ADMIN — INSULIN ASPART SCH UNITS: 100 INJECTION, SOLUTION INTRAVENOUS; SUBCUTANEOUS at 13:20

## 2019-12-04 RX ADMIN — DEXLANSOPRAZOLE SCH: 30 CAPSULE, DELAYED RELEASE ORAL at 11:34

## 2019-12-04 RX ADMIN — SPIRONOLACTONE SCH MG: 25 TABLET ORAL at 09:30

## 2019-12-04 RX ADMIN — VALSARTAN SCH MG: 40 TABLET ORAL at 21:16

## 2019-12-04 RX ADMIN — DOXYCYCLINE SCH MG: 100 TABLET ORAL at 21:14

## 2019-12-04 RX ADMIN — METOPROLOL SUCCINATE SCH MG: 25 TABLET, EXTENDED RELEASE ORAL at 09:30

## 2019-12-04 RX ADMIN — INSULIN DETEMIR SCH UNIT: 100 INJECTION, SOLUTION SUBCUTANEOUS at 21:37

## 2019-12-04 RX ADMIN — DOXYCYCLINE SCH MG: 100 TABLET ORAL at 11:44

## 2019-12-04 RX ADMIN — SPIRONOLACTONE SCH MG: 25 TABLET ORAL at 11:44

## 2019-12-04 RX ADMIN — INSULIN ASPART SCH UNITS: 100 INJECTION, SOLUTION INTRAVENOUS; SUBCUTANEOUS at 17:46

## 2019-12-04 RX ADMIN — INSULIN ASPART SCH: 100 INJECTION, SOLUTION INTRAVENOUS; SUBCUTANEOUS at 08:31

## 2019-12-04 RX ADMIN — APIXABAN SCH MG: 5 TABLET, FILM COATED ORAL at 21:13

## 2019-12-04 RX ADMIN — FUROSEMIDE SCH MG: 10 INJECTION, SOLUTION INTRAMUSCULAR; INTRAVENOUS at 06:03

## 2019-12-04 NOTE — PN
Subjective


Date of Service: 12/04/19


Interval History: 





No acute events overnight. Pt expresses significant dismay that she received a 

dose of vancomycin overnight, and also that she was written for doxycycline 

this morning. Again explained how we switch from IV to oral medications, and 

that the plan, as we discussed yesterday, was to switch to PO. Patient 

expresses understanding, but that she is concerned because one time her 

cellulitis worsened after switching to PO.





Reports significant improvement in LE swelling and redness. Also improvement in 

pain. No fevers or chills. Is having some vulvovaginal itching, unsure if 

dysuria, would like to be tested for UTI.








Objective


Active Medications: 





Acetaminophen (Tylenol Tab*)  650 mg PO Q4H PRN


   PRN Reason: PAIN - MILD


Al Hydrox/Mg Hydrox/Simethicone (Maalox Plus*)  30 ml PO Q6H PRN


   PRN Reason: INDIGESTION


Apixaban (Eliquis*)  5 mg PO BID [facility]


   Last Admin: 12/04/19 09:30 Dose:  5 mg


Dexlansoprazole (Dexilant (Nf))  30 mg PO DAILY [facility]


   Last Admin: 12/04/19 11:34 Dose:  Not Given


Dextrose (Dextrose 50% Vial 50 Ml*)  25 ml IV PUSH .FOR FS < 60 - SS PRN


   PRN Reason: FS < 60


Docusate Sodium (Colace Cap*)  100 mg PO BID PRN


   PRN Reason: CONSTIPATION


Doxycycline Monohydrate (Doxycycline Monohydrate)  100 mg PO BID [facility]


   Last Admin: 12/04/19 11:44 Dose:  100 mg


Hydroxyzine HCl (Atarax Tab*)  25 mg PO Q12H PRN


   PRN Reason: itching


Insulin Aspart (Novolog (Nf))  0 unit SUBCUT AC [facility]; Protocol


   Last Admin: 12/04/19 13:20 Dose:  3 units


Insulin Detemir (Levemir (Nf))  15 unit SUBCUT BEDTIME [facility]


   Last Admin: 12/03/19 20:56 Dose:  15 unit


Magnesium Hydroxide (Milk Of Magnesia Liq*)  30 ml PO Q4H PRN


   PRN Reason: CONSTIPATION


Metoprolol Succinate (Toprol Xl Tab*)  25 mg PO DAILY [facility]


   Last Admin: 12/04/19 09:30 Dose:  25 mg


Senna (Senokot 8.6 Mg Tab*)  1 tab PO BID PRN


   PRN Reason: CONSTIPATION


   Last Admin: 12/03/19 20:41 Dose:  1 tab


Spironolactone (Aldactone Tab*)  25 mg PO DAILY [facility]


   Last Admin: 12/04/19 11:44 Dose:  25 mg


Temazepam (Restoril Cap*)  15 mg PO BEDTIME PRN


   PRN Reason: INSOMNIA


Torsemide (Torsemide)  20 mg PO DAILY [facility]


Valsartan (Diovan Tab*)  40 mg PO BEDTIME [facility]


   Last Admin: 12/03/19 20:41 Dose:  40 mg





 Vital Signs - 8 hr











  12/04/19 12/04/19





  07:48 11:21


 


Temperature 97.4 F 97.2 F


 


Pulse Rate 62 71


 


Respiratory 20 18





Rate  


 


Blood Pressure 144/62 130/70





(mmHg)  


 


O2 Sat by Pulse 97 100





Oximetry  











Oxygen Devices in Use Now: None


Appearance: well appearing woman sitting up in bed


Skin: - - LLE with eryethema over posterior calf below marker line, improving, 

, less hot, no open wounds, scabbed areas healing; RLE with small 

area of erythema anteriorly with healing scabs


Neurological: Alert and Oriented x 3


Result Diagrams: 


 12/03/19 10:48





 12/03/19 10:48


Additional Lab and Data: 





  Laboratory Results - last 24 hr











  11/30/19 11/30/19 11/30/19





  04:51 16:58 21:21


 


WBC   


 


RBC   


 


Hgb   


 


Hct   


 


MCV   


 


MCH   


 


MCHC   


 


RDW   


 


Plt Count   


 


MPV   


 


Neut % (Auto)   


 


Lymph % (Auto)   


 


Mono % (Auto)   


 


Eos % (Auto)   


 


Baso % (Auto)   


 


Absolute Neuts (auto)   


 


Absolute Lymphs (auto)   


 


Absolute Monos (auto)   


 


Absolute Eos (auto)   


 


Absolute Basos (auto)   


 


Absolute Nucleated RBC   


 


Nucleated RBC %   


 


ESR  78 H  


 


Sodium   


 


Potassium   


 


Chloride   


 


Carbon Dioxide   


 


Anion Gap   


 


BUN   


 


Creatinine   


 


Est GFR ( Amer)   


 


Est GFR (Non-Af Amer)   


 


BUN/Creatinine Ratio   


 


Glucose   


 


POC Glucose (mg/dL)   144 H  219 H


 


Calcium   


 


Magnesium   


 


C-Reactive Protein   














  12/01/19 12/01/19 12/01/19





  05:34 05:34 07:40


 


WBC  15.8 H  


 


RBC  3.51 L  


 


Hgb  10.1 L  


 


Hct  30 L  


 


MCV  85  


 


MCH  29  


 


MCHC  34  


 


RDW  15  


 


Plt Count  208  


 


MPV  9.4  


 


Neut % (Auto)  79.5  


 


Lymph % (Auto)  12.5  


 


Mono % (Auto)  5.5  


 


Eos % (Auto)  2.0  


 


Baso % (Auto)  0.5  


 


Absolute Neuts (auto)  12.6 H  


 


Absolute Lymphs (auto)  2.0  


 


Absolute Monos (auto)  0.9 H  


 


Absolute Eos (auto)  0.3  


 


Absolute Basos (auto)  0.1  


 


Absolute Nucleated RBC  0.0  


 


Nucleated RBC %  0.0  


 


ESR   


 


Sodium   136 


 


Potassium   3.4 L 


 


Chloride   102 


 


Carbon Dioxide   24 


 


Anion Gap   10 


 


BUN   18 


 


Creatinine   1.44 H 


 


Est GFR ( Amer)   43.7 


 


Est GFR (Non-Af Amer)   36.1 


 


BUN/Creatinine Ratio   12.5 


 


Glucose   130 H 


 


POC Glucose (mg/dL)    133 H


 


Calcium   8.8 


 


Magnesium   1.9 


 


C-Reactive Protein   253.11 H 














  12/01/19





  11:57


 


WBC 


 


RBC 


 


Hgb 


 


Hct 


 


MCV 


 


MCH 


 


MCHC 


 


RDW 


 


Plt Count 


 


MPV 


 


Neut % (Auto) 


 


Lymph % (Auto) 


 


Mono % (Auto) 


 


Eos % (Auto) 


 


Baso % (Auto) 


 


Absolute Neuts (auto) 


 


Absolute Lymphs (auto) 


 


Absolute Monos (auto) 


 


Absolute Eos (auto) 


 


Absolute Basos (auto) 


 


Absolute Nucleated RBC 


 


Nucleated RBC % 


 


ESR 


 


Sodium 


 


Potassium 


 


Chloride 


 


Carbon Dioxide 


 


Anion Gap 


 


BUN 


 


Creatinine 


 


Est GFR ( Amer) 


 


Est GFR (Non-Af Amer) 


 


BUN/Creatinine Ratio 


 


Glucose 


 


POC Glucose (mg/dL)  172 H


 


Calcium 


 


Magnesium 


 


C-Reactive Protein 











Microbiology and Other Data: 





 Microbiology





11/30/19 04:15   Urine   Urine Culture - Final


11/29/19 19:25   Blood Venous   Aerobic Blood Culture - Preliminary


                            No Growth Day 1


11/29/19 19:25   Blood Venous   Anaerobic Blood Culture - Preliminary


                            No Growth Day 1


11/29/19 19:25   Blood Venous   Aerobic Blood Culture - Preliminary


                            No Growth Day 1


11/29/19 19:25   Blood Venous   Anaerobic Blood Culture - Preliminary


                            No Growth Day 1




















Assess/Plan/Problems-Billing


Assessment: 





69W with chronic lymphadema, DVT on AC, HFpEF, CKD III, obesity, and IDDM, p/w 

sepsis secondary to LLE cellulitis. Also with signs of volume overload.








- Patient Problems


(1) Cellulitis


Comment: Hx of suspected Strep cellulitis in July. Now recurrent cellulitis. 

Has been following with ID Dr. Hdez as outpatient.


- changed cefazolin (11/29 - 12/1) to vanc IV (12/1- 12/4) with sig improvement

; now doxy PO (12/4 - 12/14)


- pt does not want retrial of clinda given generalized rash last time (though 

of note she did complete the course)


- atarax prn itching   





(2) Diastolic CHF


Comment: Pending records from Dr. Garcia for recent outpatient TTE. Prior 

was August 2015 (available through [facility] records), which showed grade 2 

diastolic dysfunction, EF 55-60%, mild MVR.


- restart home torsemide 20mg PO but make daily


- daily weights; strict Is&Os


- continue metoprolol and spironolactone   





(3) IDDM (insulin dependent diabetes mellitus)


Comment: Home dose was 26U AM, 24U PM, with short-acting 10/10/14 tid, but was 

getting frequent symptomatic hypogylcemia to 48  five times in last 2 weeks. 

Reports there is "nothing to eat" on her meal trays, so insulin requirement 

significantly lower here.


- cont insulin detemir 15u daily


- add lispro sliding scale


- POC glucose qAC   





(4) Deep vein thrombosis (DVT) of left lower extremity


Comment: LLE superficial femoral vein back in July, Seeing heme onc as 

outpatient. Duplex 11/29 did not show DVT on left.


 - continue eliquis  5 mg BID   





(5) Lymphedema


Comment: She is still pending [place] [facility] for over a year, 

considering going to [place]. Has also been seen by Kimberly Vidal in 

[place] but was not tolerant of compression systems offered at that time (felt 

like pushed fluid into abdomen).


- continue elevation


- diurese as above, as HF may be component of LE edema   





(6) Hypertension


Comment: 


- continue home metoprolol suc 25mg daily, valsartan 40mg, spironolactone 25mg 

  





(7) Yeast infection


Comment: fluc x 1   





(8) DVT prophylaxis


Comment: 


 - Continue eliquis as per above.   





(9) Full code status


Comment: Full code


   


Status and Disposition: 





Possible DC tomorrow if tolerates PO abx.

## 2019-12-04 NOTE — PN
Progress Note





- Progress Note


Date of Service: 12/04/19


SOAP: 


Subjective:


CC: Left lower leg cellulitis





HPI:


Ms. Geronimo is a 68 yo female with PMH significant for bilateral LE lymphedema, 

left LE DVT, HTN, GERD, Drummond's espophagus, DM2, CKD stage 3, D CHF; who 

presented to the hospital with left LE erythema and edema. Denies fever, chills

, nausea, vomiting, or diarrhea. She feels that the erythema and edema in the 

legs is improving since her admission. Reports vaginal burning and dysuria. 

Reports constipation, has not moved her bowels in a few days.





Objective:


 Vital Signs - 8 hr











  12/04/19 12/04/19





  03:31 07:48


 


Temperature 98 F 97.4 F


 


Pulse Rate 64 62


 


Respiratory 20 20





Rate  


 


Blood Pressure 152/62 144/62





(mmHg)  


 


O2 Sat by Pulse 97 97





Oximetry  








Physical:


General: NAD, sitting up in bed


Neurological: Alert and Oriented x 4


HEENT: Moist MM, no thrush


Cardiovascular: Heart rate regular. Bilateral LE edema. 


Respiratory: Lung sounds clear bilateral


Abdominal: Bowel sounds present; ABD soft, non tender and non distended


Skin: No rash. Slight erythema to the right anterior shin and left lower leg (

receding from markings), there are scabbed areas to the anterior shins and the 

posterior left leg. 





 Laboratory Results - last 24 hr











  12/03/19 12/03/19 12/03/19





  10:48 10:48 10:48


 


WBC  10.4  


 


RBC  3.77  


 


Hgb  10.6 L  


 


Hct  31 L  


 


MCV  83  


 


MCH  28  


 


MCHC  34  


 


RDW  14  


 


Plt Count  269  


 


MPV  9.2  


 


Neut % (Auto)  64.9  


 


Lymph % (Auto)  21.4  


 


Mono % (Auto)  7.1  


 


Eos % (Auto)  5.3  


 


Baso % (Auto)  1.3  


 


Absolute Neuts (auto)  6.8  


 


Absolute Lymphs (auto)  2.2  


 


Absolute Monos (auto)  0.7  


 


Absolute Eos (auto)  0.6  


 


Absolute Basos (auto)  0.1  


 


Absolute Nucleated RBC  0.0  


 


Nucleated RBC %  0.0  


 


Sodium   139 


 


Potassium   3.8 


 


Chloride   104 


 


Carbon Dioxide   25 


 


Anion Gap   10 


 


BUN   17 


 


Creatinine   1.10 H 


 


Est GFR ( Amer)   59.6 


 


Est GFR (Non-Af Amer)   49.2 


 


BUN/Creatinine Ratio   15.5 


 


Glucose   175 H 


 


POC Glucose (mg/dL)   


 


Calcium   8.9 


 


Magnesium   2.0 


 


C-Reactive Protein   88.70 H 


 


Vancomycin Trough    16.9








 Microbiology











 11/29/19 19:25 Aerobic Blood Culture - Preliminary





 Blood Venous    No Growth Day 4





 Anaerobic Blood Culture - Preliminary





    No Growth Day 4


 


 11/29/19 19:25 Aerobic Blood Culture - Preliminary





 Blood Venous    No Growth Day 4





 Anaerobic Blood Culture - Preliminary





    No Growth Day 4


 


 12/02/19 02:30 Nasal Screen MRSA (PCR) - Final





 Nasal    Mrsa Detected


 


 11/30/19 04:15 Urine Culture - Final





 Urine 








Assessment:


1. Left lower leg cellulitis. Afebrile and no leukocytosis. Blood cultures with 

no growth. CRP is trending down. Improving erythema and edema. Continues to 

have pain in the upper left calf. Venous doppler on admission with no signs of 

DVT.


2. Bilateral LE lymphedema. 


3. History of left LE DVT. Diagnosed in July 2019.


4. DM2.


5. Diastolic CHF. Receiving IV lasix here.


6. Obesity. BMI 38.5.





Plan:


Continue Doxycycline to complete a 14 day course of ABX. Day 5/14 of ABX. 

Followup with ID outpatient on 1/2/20 at 1130am. She will call the office prior 

if she has any concerns.





25 minutes floor time > 50 % spent with the patient discussing symptoms, 

treatment recommendations, and when to call the office for concerns of 

infection or ABX side effects.

## 2019-12-05 VITALS — SYSTOLIC BLOOD PRESSURE: 135 MMHG | DIASTOLIC BLOOD PRESSURE: 52 MMHG

## 2019-12-05 LAB
HCT VFR BLD AUTO: 33 % (ref 35–47)
HGB BLD-MCNC: 11 G/DL (ref 12–16)
MCH RBC QN AUTO: 28 PG (ref 27–31)
MCHC RBC AUTO-ENTMCNC: 34 G/DL (ref 31–36)
MCV RBC AUTO: 84 FL (ref 80–97)
PLATELET # BLD AUTO: 302 10^3/UL (ref 150–450)
RBC # BLD AUTO: 3.89 10^6 /UL (ref 3.7–4.87)
WBC # BLD AUTO: 11 10^3/UL (ref 3.5–10.8)

## 2019-12-05 RX ADMIN — DOXYCYCLINE SCH MG: 100 TABLET ORAL at 10:07

## 2019-12-05 RX ADMIN — INSULIN ASPART SCH UNITS: 100 INJECTION, SOLUTION INTRAVENOUS; SUBCUTANEOUS at 10:05

## 2019-12-05 RX ADMIN — DEXLANSOPRAZOLE SCH: 30 CAPSULE, DELAYED RELEASE ORAL at 10:05

## 2019-12-05 RX ADMIN — SPIRONOLACTONE SCH MG: 25 TABLET ORAL at 10:08

## 2019-12-05 RX ADMIN — APIXABAN SCH MG: 5 TABLET, FILM COATED ORAL at 10:07

## 2019-12-05 RX ADMIN — METOPROLOL SUCCINATE SCH MG: 25 TABLET, EXTENDED RELEASE ORAL at 10:08

## 2019-12-05 NOTE — DS
CC:  Angelo Jolly MD; Vandana Rao NP; Darien Richmond MD

 

DISCHARGE SUMMARY:

 

DATE OF ADMISSION:  11/29/19

DATE OF DISCHARGE:  12/05/19

 

PRIMARY CARE PHYSICIAN:  Angelo Jolly MD

 

INFECTIOUS DISEASE SPECIALIST:  Vandana Rao NP

 

PRIMARY DIAGNOSES:

1.  Cellulitis.

2.  Lymphedema.

 

SECONDARY DIAGNOSES:

1.  Heart failure with preserved ejection fraction.

2.  Insulin-dependent diabetes.

3.  History of DVT, now on Eliquis.

4.  Hypertension.

5.  Gastroesophageal reflux disease.

6.  Chronic kidney disease.

7.  Personality disorder, not otherwise specified.

 

CONSULTS:  Moshe Hdez MD, and Vandana Rao NP, of ID.

 

DISCHARGE MEDICATIONS:

1.  Doxycycline 100 mg twice a day for 9 more days.

2.  Apixaban 5 mg twice a day.

3.  Torsemide 20 mg daily.

4.  Insulin detemir 26 units in the morning, 24 units at night.

5.  Insulin aspart sliding scale.

6.  Dexlansoprazole 30 mg daily.

7.  Metoprolol succinate 25 mg daily.

8.  Valsartan 40 mg at bedtime.

9.  Spironolactone 25 mg daily.

10.  Fish oil 1000 mg daily.

11.  Multivitamin 1 tablet daily.

 

HISTORY OF PRESENT ILLNESS:  

Ms. Geronimo is a 69-year-old woman with chronic lymphedema and recurrent 
cellulitis; heart failure with preserved ejection fraction; DM2, on insulin; 
hypertension; GERD; and CKD; who presents to [facility] complaining of worsening lower 
extremity pain associated with fever of 104 degrees at home.  The patient has 
been following with [facility] and she was given an outpatient prescription for 
doxycycline to take if she noticed recurrence of cellulitis in her lower 
extremities.  However, when she did notice this recurrence recently, she called 
the infectious disease clinic and was advised by staff to present to the 
emergency room.  The patient was also reporting nausea, but denies other 
symptoms.

 

HOSPITAL COURSE:  

The patient was admitted with diagnosis of sepsis from cellulitis.  She was 
noted to have a temperature of 103 degrees in the emergency room.  She was 
initially started on cefazolin; however, over 2 days, she did not experience 
significant improvement in her symptoms and her CRP was unchanged, initially 
250 and after 2 days on cefazolin 253.  She was swabbed for MRSA in her nares, 
which resulted positive, so she was switched to vancomycin.  After 
approximately 1 day on vancomycin, she began to experience slight improvement 
in her left lower extremity pain and swelling with receding of the erythema 
from the marker line.  She was seen and evaluated by infectious disease team, 
who recommended to transition to oral doxycycline at least 24 hours before 
discharge.  The patient had been given daily doses of furosemide 20 mg IV with 
significant urine output and drastic reduction in her lower extremity swelling. 
Also of note throughout hospitalization, her insulin requirements were 
significantly lower than at home.  At home, she reports taking approximately 80 
units of insulin a day between long and short acting doses; however, in the 
hospital, she was well controlled on detemir 15 units and rarely required doses 
during meal times.  The patient was able to ambulate around the floors without 
lightheadedness after significant diuresis, so she was transitioned back to her 
home dose of oral torsemide, but recommended to take this every day going 
forward rather than every other day, and she was educated on continued measures 
to reduce lymphedema and therefore reduce recurrence of cellulitis.  After 
transitioning from vancomycin to doxycycline for over 24 hours, her CRP 
continued to decrease. She had no recurrence of her fever.  After 24 hours on 
doxycycline, the patient reports significant improvement in her lower extremity 
pain and swelling with significant improvement in lower extremity erythema.  On 
day of discharge, a 10- point review of systems was performed and the patient 
denies fevers, chills, skin breakdown, chest pain, shortness of breath, new 
rash.  No other pertinent positives or negatives.  Infectious Disease 
recommended to complete a 14-day course of antibiotics with p.o. doxycycline, 
and this was prescribed to the patient's preferred pharmacy in [place].  
She has a preference to avoid any medications that are made in [facility].  
The patient was also strongly encouraged to follow up in the lymphedema clinic 
to prevent recurrence of her cellulitis.

 

PHYSICAL EXAM:  Afebrile, heart rate 63, blood pressure 135/52, respiratory 
rate 18, oxygen saturation 98% on room air.  In general, she is a well-
appearing woman, in no acute distress, wearing sunglasses.  OP clear.  Moist 
mucous membranes. Neck:  Supple.  No JVD.  Heart:  Regular rate and rhythm.  No 
murmurs, gallops, or rubs.  Lungs:  Clear to auscultation bilaterally.  Abdomen
:  Soft, nontender, nondistended.  Extremities:  Warm and well perfused.  2+ 
nonpitting edema over lower extremities below the knees with significant 
wrinkling.  Skin: Circumferential erythema over left distal lower extremity 
significantly improved, no longer hot.  Few scabbed lesions healing.  Right 
lower extremity with very mild erythema anteriorly, not hot, also with healing 
scabs.  No drainage or malodor. Neuro:  A and O x3.  No focal deficits.

 

PERTINENT DIAGNOSTIC STUDIES:  



CBC with hemoglobin 11, which appears to be the patient's baseline, with MCV 84.

 

BMP with creatinine 1.1 at baseline.

 

CRP decreased from 250 to 40 by day of discharge.

 

UA clear on repeat.

 

Chest x-ray without acute cardiopulmonary process.

 

Venous Dopplers of left leg without evidence of DVT.

 

DISCHARGE PLAN:  

The patient should follow up with her primary care physician as well as Vandana Rao NP, in infectious disease clinic.  She was discharged with 
9 more days of doxycycline p.o. to complete a 14-day course on appropriate 
antibiotic coverage.

 

For the patient's cellulitis, she will continue doxycycline and follow up with 
ID, but she was also encouraged to take measures to reduce lower extremity 
swelling which will involve attending a lymphedema clinic as well as monitoring 
her daily weights and taking extra diuretics as needed to reduce lower 
extremity from heart failure.  She was also encouraged to elevate her legs, use 
compression stockings, and to exercise as able.  She was encouraged to switch 
her torsemide from every other day to daily.  Although she reports in the past, 
she would get lightheaded on daily dosing, she did tolerate daily dosing in the 
hospital.

 

For her diabetes, she will continue to follow up with her outpatient provider, 
Dr. Darien Richmond.  She had significantly decreased insulin requirements in the 
hospital as she had improved diet here.  She was encouraged to continue 
monitoring frequent fingersticks at home and to titrate her insulin as able.

 

She was educated to eat a healthy diet, low in processed foods and low in 
sugar. She was encouraged to pursue an exercise routine.  She was given return 
precautions which include, but are not limited to, recurrence of fever, 
worsening lower extremity edema or pain.

 

DISPOSITION:  To home.

 

CONDITION:  Good.

 

TIME SPENT:  Approximately 60 minutes was spent on discharge of this patient, 
more than half of which was spent with care coordination at bedside for 
interview and exam.

 

 [id number] #: [id number]

ANJELICA

## 2022-05-10 NOTE — ED
"Patient interacting with select peers. Patient labile mood. Patient reports " I am starting to feel better but I get anxious because he will wean me off of my benzo's. Educated patient on plan of care and medication regiment. Patient drug seeking at times. Patient denies SI/HI no self harming behavior displayed, no aggressive behavior displayed towards others. Patient contracted safety with staff and unit.  Discussed healthy coping skills and techniques.  Educated, reviewed  and discussed plan of care and medication regiment with this patient. Patient voiced understanding of all teachings.    " Abdominal Pain/Female





- HPI Summary


HPI Summary: 


This patient is a 68 year old F presenting to [facility] with a chief complaint of 

pain in the right side of the abdomen radiating to groin since 7/5/19. Pt was 

in hospital being treated for cellulitus/DVT, and during treatment she 

developed this pain but is was deemed trivial. On the night of 7/4/19 her lungs 

were filled with fluids, and this caused SOB, and coughing. Then the coughing 

triggered abdominal pain. She reports it is also painful when lifting right leg 

and edema in abdomen.








- History of Current Complaint


Chief Complaint: EDAbdPain


Stated Complaint: RIGHT HIP AND ABD PIAN PER PT


Time Seen by Provider: 07/11/19 14:36


Hx Obtained From: Patient


Onset/Duration: Lasting Days


Timing: Constant


Severity Initially: Moderate


Severity Currently: None


Pain Intensity: 0


Pain Scale Used: 0-10 Numeric


Location: Discrete At: RLQ


Radiates: Yes


Radiates to: Other - groin


Aggravating Factor(s): Movement, Other: - Coughing


Associated Signs and Symptoms: Positive: Cough, Other: - pos - SOB


Allergies/Adverse Reactions: 


 Allergies











Allergy/AdvReac Type Severity Reaction Status Date / Time


 


ibuprofen Allergy  Unknown Verified 07/11/19 12:19





   Reaction  





   Details  


 


metformin Allergy  Unknown Verified 07/11/19 12:19





   Reaction  





   Details  


 


NSAIDS (Non-Steroidal Allergy  Unknown Verified 07/11/19 14:38





Anti-Inflamma   Reaction  





   Details  


 


various narcotics AdvReac  Nausea Uncoded 07/11/19 12:19














PMH/Surg Hx/FS Hx/Imm Hx


Endocrine/Hematology History: Reports: Hx Diabetes, Hx Thyroid Disease


Cardiovascular History: Reports: Hx Angina, Hx Hypercholesterolemia, Hx 

Hypertension, Hx Peripheral Vascular Disease


   Denies: Hx Cardiomegaly, Hx Congenital Heart Disease, Hx Congestive Heart 

Failure, Hx Pacemaker/ICD


Respiratory History: 


   Denies: Hx Asthma - AS A CHILD, Hx Cystic Fibrosis, Hx Pleural Effusion, Hx 

Pneumonia, Hx Pulmonary Embolism


GI History: Reports: Hx Diverticulosis, Hx Gastroesophageal Reflux Disease, Hx 

Hiatal Hernia, Hx Irritable Bowel, Other GI Disorders - DIFFICULTY SWALLOWING, 

DIARRHEA/CONSTIPATION


 History: Reports: Other  Problems/Disorders - INCONTINENCE


   Denies: Hx Dialysis, Hx Renal Disease


Musculoskeletal History: Reports: Hx Arthritis, Hx Back Problems, Hx Orthopedic 

Injury, Other Musculoskeletal History - WEAKNESS/TREMORS


Sensory History: Reports: Hx Cataracts, Hx Contacts or Glasses


   Denies: Hx Eye Injury, Hx Eye Prosthesis, Hx Legally Blind, Hx Macular 

Degeneration, Hx Vision Problem, Hx Hearing Aid, Hx Hearing Problem, Other 

Sensory Impairments


Opthamlomology History: Reports: Hx Cataracts, Hx Contacts or Glasses


   Denies: Hx Eye Injury, Hx Eye Prosthesis, Hx Legally Blind, Hx Macular 

Degeneration, Hx Vision Problem, Other Sensory Impairments


Neurological History: Reports: Hx Headaches, Other Neuro Impairments/Disorders 

- POST CONCUSSION SYNDROME, DIZZINESS


Psychiatric History: 


   Denies: Hx Depression, Hx Panic Disorder, Hx Suicide Attempt, Hx of Violent 

Episodes Against Others





- Surgical History


Surgery Procedure, Year, and Place: HYSTERECTOMY; ENDOMETRIOSIS SURGERY; RT AND 

LT BREAST BIOPSY; BREAST REDUCTION;.  NOVEMBER 2017 LUMBAR FUSION AND CYST 

REMOVAL;.  back surgery 2018


Infectious Disease History: No


Infectious Disease History: 


   Denies: Hx Clostridium Difficile, Hx Hepatitis, Hx of Known/Suspected MRSA, 

Hx Shingles, Hx Tuberculosis, Hx Known/Suspected VRE, Hx Known/Suspected VRSA, 

History Other Infectious Disease, Traveled Outside the [place] in Last 30 Days





- Family History


Known Family History: Positive: Diabetes, Other - prostate and skin cancer, Non-

Contributory





- Social History


Occupation: Retired


Lives: With Family


Alcohol Use: Rare


Alcohol Amount: wine


Hx Substance Use: No


Substance Use Type: Reports: None


Hx Tobacco Use: No


Smoking Status (MU): Never Smoked Tobacco


Have You Smoked in the Last Year: No





Review of Systems


Positive: Shortness Of Breath, Cough


Positive: Abdominal Pain


All Other Systems Reviewed And Are Negative: Yes





Physical Exam





- Summary


Physical Exam Summary: 





VITAL SIGNS: Reviewed. 


GENERAL: Patient is a well-developed and nourished female who is lying 

comfortable in the stretcher. Patient is not in any acute respiratory distress. 


HEAD AND FACE: Normocephalic and atraumatic. 


EYES: PERRLA, EOMI x 2, No injected conjunctiva.


EARS: Hearing grossly intact. Ear canals and tympanic membranes are WNL.


MOUTH: Oropharynx within normal limits. 


NECK: Supple, trachea is midline, no adenopathy, no JVD.


CHEST: Symmetric, no tenderness at palpation. 


LUNGS: Clear to auscultation bilaterally. No wheezing or crackles.


CVS: RRR, S1 and S2 present, no murmurs or gallops appreciated. 


ABDOMEN: Soft, RLQ tenderness. No signs of distention. Positive bowel sounds. 

No rebound, no guarding, and no masses palpated. No abdominal bruit or 

pulsations. 


EXTREMITIES: FROM in all major joints, no edema, no cyanosis or clubbing.


NEURO: Alert and oriented x 3. No acute neurological deficits. Speech is normal.


SKIN: Dry and warm.








Triage Information Reviewed: Yes


Vital Signs On Initial Exam: 


 Initial Vitals











Temp Pulse Resp BP Pulse Ox


 


 99.8 F   76   20   191/88   97 


 


 07/11/19 12:11  07/11/19 12:11  07/11/19 12:11  07/11/19 12:11  07/11/19 12:11











Vital Signs Reviewed: Yes





Diagnostics





- Vital Signs


 Vital Signs











  Temp Pulse Resp BP Pulse Ox


 


 07/11/19 14:11  100.4 F  76  18  172/68  100


 


 07/11/19 12:11  99.8 F  76  20  191/88  97














- Laboratory


Lab Results: 


 Lab Results











  07/11/19 Range/Units





  14:20 


 


WBC  10.9 H  (3.5-10.8)  10^3/uL


 


RBC  3.84  (3.70-4.87)  10^6 /uL


 


Hgb  11.3 L  (12.0-16.0)  g/dL


 


Hct  34 L  (35-47)  %


 


MCV  87  (80-97)  fL


 


MCH  29  (27-31)  pg


 


MCHC  34  (31-36)  g/dL


 


RDW  14  (10-15)  %


 


Plt Count  345  (150-450)  10^3/uL


 


MPV  8.6  (7.4-10.4)  fL


 


Neut % (Auto)  70.7  %


 


Lymph % (Auto)  19.7  %


 


Mono % (Auto)  6.7  %


 


Eos % (Auto)  2.1  %


 


Baso % (Auto)  0.8  %


 


Absolute Neuts (auto)  7.7  (1.5-7.7)  10^3/ul


 


Absolute Lymphs (auto)  2.1  (1.0-4.8)  10^3/ul


 


Absolute Monos (auto)  0.7  (0-0.8)  10^3/ul


 


Absolute Eos (auto)  0.2  (0-0.6)  10^3/ul


 


Absolute Basos (auto)  0.1  (0-0.2)  10^3/ul


 


Absolute Nucleated RBC  0.0  10^3/ul


 


Nucleated RBC %  0.0  











Result Diagrams: 


 07/11/19 14:20





 07/11/19 14:20


Lab Statement: Any lab studies that have been ordered have been reviewed, and 

results considered in the medical decision making process.





Abdominal Pain Fem Course/Dx





- Course


Course Of Treatment: This patient is a 68 year old F presenting to [facility] with a 

chief complaint of pain in the right side of the abdomen radiating to groin 

since 7/5/19. Pt was in hospital being treated for cellulitis/DVT, and during 

treatment she developed this pain but is was deemed trivial. On the night of 7/4 /19 her lungs were filled with fluids, and this caused SOB, and coughing. Then 

the coughing triggered abdominal pain. She reports it is also painful when 

lifting right leg and edema in abdomen.  Blood test results without any 

significant abnormality except for WBCs of 10.9, hemoglobin 11.3, hematocrit 34

, creatinine 0.99, glucose of 104, AST 51, AST is 81, CRP is 35.5.  Lipase is 

less than 10.  Because of the right lower quadrant tenderness I decided to do a 

an abdominal pelvic CT.  The CT is still pending..  The patient will be signed 

out to Dr. Soto  at shift change. Patient continues to be hemodynamically 

stable.  The patient did not require any pain medications.





- Diagnoses


Provider Diagnoses: 


 Cellulitis, Abdominal wall hematoma








Discharge





- Sign-Out/Discharge


Documenting (check all that apply): Sign-Out Patient


Signing out patient TO: Pantera Soto - Pt is a sign out pending Abd CT at 

1910 on 7/11/19





- Discharge Plan


Condition: Stable


Disposition: ADMITTED TO [facility]





- Billing Disposition and Condition


Condition: STABLE


Disposition: Admitted to [facility]





- Attestation Statements


Document Initiated by Scribe: Yes


Documenting Scribe: Haylee Babb


Provider For Whom Scribe is Documenting (Include Credential): Dr. Jordan Matta MD


Scribe Attestation: 


Haylee SILVA scribed for Dr. Jordan Matta MD on 07/12/19 at 0728. 


Scribe Documentation Reviewed: Yes


Provider Attestation: 


The documentation as recorded by the Haylee jovel accurately 

reflects the service I personally performed and the decisions made by me, Dr. Jordan Matta MD


Status of Scribe Document: Viewed